# Patient Record
Sex: MALE | Race: OTHER | NOT HISPANIC OR LATINO | URBAN - METROPOLITAN AREA
[De-identification: names, ages, dates, MRNs, and addresses within clinical notes are randomized per-mention and may not be internally consistent; named-entity substitution may affect disease eponyms.]

---

## 2020-05-17 ENCOUNTER — INPATIENT (INPATIENT)
Facility: HOSPITAL | Age: 65
LOS: 2 days | Discharge: HOME | DRG: 57 | End: 2020-05-20
Attending: INTERNAL MEDICINE | Admitting: INTERNAL MEDICINE
Payer: MEDICAID

## 2020-05-17 VITALS
DIASTOLIC BLOOD PRESSURE: 82 MMHG | TEMPERATURE: 98 F | OXYGEN SATURATION: 99 % | HEIGHT: 64.57 IN | HEART RATE: 62 BPM | WEIGHT: 145.06 LBS | RESPIRATION RATE: 18 BRPM | SYSTOLIC BLOOD PRESSURE: 136 MMHG

## 2020-05-17 DIAGNOSIS — I25.10 ATHEROSCLEROTIC HEART DISEASE OF NATIVE CORONARY ARTERY WITHOUT ANGINA PECTORIS: ICD-10-CM

## 2020-05-17 DIAGNOSIS — Z29.9 ENCOUNTER FOR PROPHYLACTIC MEASURES, UNSPECIFIED: ICD-10-CM

## 2020-05-17 DIAGNOSIS — I63.9 CEREBRAL INFARCTION, UNSPECIFIED: ICD-10-CM

## 2020-05-17 DIAGNOSIS — E78.5 HYPERLIPIDEMIA, UNSPECIFIED: ICD-10-CM

## 2020-05-17 DIAGNOSIS — I10 ESSENTIAL (PRIMARY) HYPERTENSION: ICD-10-CM

## 2020-05-17 DIAGNOSIS — R07.9 CHEST PAIN, UNSPECIFIED: ICD-10-CM

## 2020-05-17 LAB
ALBUMIN SERPL ELPH-MCNC: 3.7 G/DL — SIGNIFICANT CHANGE UP (ref 3.5–5)
ALP SERPL-CCNC: 70 U/L — SIGNIFICANT CHANGE UP (ref 40–120)
ALT FLD-CCNC: 28 U/L DA — SIGNIFICANT CHANGE UP (ref 10–60)
ANION GAP SERPL CALC-SCNC: 3 MMOL/L — LOW (ref 5–17)
AST SERPL-CCNC: 23 U/L — SIGNIFICANT CHANGE UP (ref 10–40)
BASOPHILS # BLD AUTO: 0.02 K/UL — SIGNIFICANT CHANGE UP (ref 0–0.2)
BASOPHILS NFR BLD AUTO: 0.3 % — SIGNIFICANT CHANGE UP (ref 0–2)
BILIRUB SERPL-MCNC: 0.6 MG/DL — SIGNIFICANT CHANGE UP (ref 0.2–1.2)
BUN SERPL-MCNC: 13 MG/DL — SIGNIFICANT CHANGE UP (ref 7–18)
CALCIUM SERPL-MCNC: 8.5 MG/DL — SIGNIFICANT CHANGE UP (ref 8.4–10.5)
CHLORIDE SERPL-SCNC: 108 MMOL/L — SIGNIFICANT CHANGE UP (ref 96–108)
CO2 SERPL-SCNC: 32 MMOL/L — HIGH (ref 22–31)
CREAT SERPL-MCNC: 0.88 MG/DL — SIGNIFICANT CHANGE UP (ref 0.5–1.3)
EOSINOPHIL # BLD AUTO: 0.08 K/UL — SIGNIFICANT CHANGE UP (ref 0–0.5)
EOSINOPHIL NFR BLD AUTO: 1.3 % — SIGNIFICANT CHANGE UP (ref 0–6)
GLUCOSE SERPL-MCNC: 103 MG/DL — HIGH (ref 70–99)
HCT VFR BLD CALC: 44 % — SIGNIFICANT CHANGE UP (ref 39–50)
HGB BLD-MCNC: 14.3 G/DL — SIGNIFICANT CHANGE UP (ref 13–17)
IMM GRANULOCYTES NFR BLD AUTO: 0.2 % — SIGNIFICANT CHANGE UP (ref 0–1.5)
LYMPHOCYTES # BLD AUTO: 1.75 K/UL — SIGNIFICANT CHANGE UP (ref 1–3.3)
LYMPHOCYTES # BLD AUTO: 28.1 % — SIGNIFICANT CHANGE UP (ref 13–44)
MAGNESIUM SERPL-MCNC: 2.2 MG/DL — SIGNIFICANT CHANGE UP (ref 1.6–2.6)
MCHC RBC-ENTMCNC: 30.4 PG — SIGNIFICANT CHANGE UP (ref 27–34)
MCHC RBC-ENTMCNC: 32.5 GM/DL — SIGNIFICANT CHANGE UP (ref 32–36)
MCV RBC AUTO: 93.4 FL — SIGNIFICANT CHANGE UP (ref 80–100)
MONOCYTES # BLD AUTO: 0.5 K/UL — SIGNIFICANT CHANGE UP (ref 0–0.9)
MONOCYTES NFR BLD AUTO: 8 % — SIGNIFICANT CHANGE UP (ref 2–14)
NEUTROPHILS # BLD AUTO: 3.87 K/UL — SIGNIFICANT CHANGE UP (ref 1.8–7.4)
NEUTROPHILS NFR BLD AUTO: 62.1 % — SIGNIFICANT CHANGE UP (ref 43–77)
NRBC # BLD: 0 /100 WBCS — SIGNIFICANT CHANGE UP (ref 0–0)
NT-PROBNP SERPL-SCNC: 150 PG/ML — HIGH (ref 0–125)
PLATELET # BLD AUTO: 184 K/UL — SIGNIFICANT CHANGE UP (ref 150–400)
POTASSIUM SERPL-MCNC: 4.6 MMOL/L — SIGNIFICANT CHANGE UP (ref 3.5–5.3)
POTASSIUM SERPL-SCNC: 4.6 MMOL/L — SIGNIFICANT CHANGE UP (ref 3.5–5.3)
PROT SERPL-MCNC: 7.1 G/DL — SIGNIFICANT CHANGE UP (ref 6–8.3)
RBC # BLD: 4.71 M/UL — SIGNIFICANT CHANGE UP (ref 4.2–5.8)
RBC # FLD: 12.2 % — SIGNIFICANT CHANGE UP (ref 10.3–14.5)
SODIUM SERPL-SCNC: 143 MMOL/L — SIGNIFICANT CHANGE UP (ref 135–145)
TROPONIN I SERPL-MCNC: <0.015 NG/ML — SIGNIFICANT CHANGE UP (ref 0–0.04)
WBC # BLD: 6.23 K/UL — SIGNIFICANT CHANGE UP (ref 3.8–10.5)
WBC # FLD AUTO: 6.23 K/UL — SIGNIFICANT CHANGE UP (ref 3.8–10.5)

## 2020-05-17 PROCEDURE — 70450 CT HEAD/BRAIN W/O DYE: CPT | Mod: 26

## 2020-05-17 PROCEDURE — 71045 X-RAY EXAM CHEST 1 VIEW: CPT | Mod: 26

## 2020-05-17 PROCEDURE — 99285 EMERGENCY DEPT VISIT HI MDM: CPT

## 2020-05-17 RX ORDER — ATORVASTATIN CALCIUM 80 MG/1
40 TABLET, FILM COATED ORAL AT BEDTIME
Refills: 0 | Status: DISCONTINUED | OUTPATIENT
Start: 2020-05-17 | End: 2020-05-20

## 2020-05-17 RX ORDER — CLOPIDOGREL BISULFATE 75 MG/1
75 TABLET, FILM COATED ORAL DAILY
Refills: 0 | Status: DISCONTINUED | OUTPATIENT
Start: 2020-05-17 | End: 2020-05-20

## 2020-05-17 RX ORDER — ENOXAPARIN SODIUM 100 MG/ML
40 INJECTION SUBCUTANEOUS DAILY
Refills: 0 | Status: DISCONTINUED | OUTPATIENT
Start: 2020-05-17 | End: 2020-05-20

## 2020-05-17 RX ORDER — PANTOPRAZOLE SODIUM 20 MG/1
40 TABLET, DELAYED RELEASE ORAL
Refills: 0 | Status: DISCONTINUED | OUTPATIENT
Start: 2020-05-17 | End: 2020-05-20

## 2020-05-17 RX ORDER — ASPIRIN/CALCIUM CARB/MAGNESIUM 324 MG
325 TABLET ORAL ONCE
Refills: 0 | Status: COMPLETED | OUTPATIENT
Start: 2020-05-17 | End: 2020-05-17

## 2020-05-17 RX ORDER — ASPIRIN/CALCIUM CARB/MAGNESIUM 324 MG
81 TABLET ORAL DAILY
Refills: 0 | Status: DISCONTINUED | OUTPATIENT
Start: 2020-05-17 | End: 2020-05-20

## 2020-05-17 RX ADMIN — Medication 325 MILLIGRAM(S): at 19:13

## 2020-05-17 RX ADMIN — ATORVASTATIN CALCIUM 40 MILLIGRAM(S): 80 TABLET, FILM COATED ORAL at 23:35

## 2020-05-17 NOTE — H&P ADULT - ASSESSMENT
64 years old Mandarin speaking man, with PMHX of  CVA (with left sided residual numbness for over 10 years, on aspirin and statin), CAD w/stent (on aspirin and plavix), HTN (metoprolol), c/o about 10-20 days of left sided numbness worse than baseline. Patient also complains of questionable weakness in left leg which is noticed since yesterday. Patient also reports several days of "pressure" in head and chest tightness as well.   Patient denies any palpitations, shortness of breath, fever, chills, nausea, vomiting, abdominal pain, change in urinary or bowel habits.  Patient is being admitted for rule out CVA. Patient is found to have decreased sensation in left side of body, CT head is negative for any acute changes and showed chronic infarct (patient has history of stroke). Will admit to telemetry. Neurology evaluation. Likely need MRI head.  Patient is also found to have chest pressure with EKG showing T wave inversions. Patient cardiologist outpatient sent baseline EKG to compare, no new EKG changes. Will trend cardiac troponin will continue aspirin and statin.  cardiology consult requested.

## 2020-05-17 NOTE — ED PROVIDER NOTE - CLINICAL SUMMARY MEDICAL DECISION MAKING FREE TEXT BOX
63 y/o man, h/o CVA (with left sided residual numbness for over 10 years), CAD w/stent, HTN, c/o about 10-20 days of left sided numbness worse than baseline, questionable weakness in left leg, several days of "pressure" in head and chest tightness as well--CT head, EKG, CXR, labs, COVID test, reassess.

## 2020-05-17 NOTE — ED PROVIDER NOTE - CARE PLAN
Principal Discharge DX:	Chest pain, unspecified type  Secondary Diagnosis:	Numbness  Secondary Diagnosis:	Abnormal EKG

## 2020-05-17 NOTE — H&P ADULT - HISTORY OF PRESENT ILLNESS
64 years old Mandarin speaking man, with PMHX of  CVA (with left sided residual numbness for over 10 years, on aspirin and statin), CAD w/stent (on aspirin and plavix), HTN (metoprolol), c/o about 10-20 days of left sided numbness worse than baseline. Patient also complains of questionable weakness in left leg which is noticed since yesterday. Patient also reports several days of "pressure" in head and chest tightness as well.   Patient denies any palpitations, shortness of breath, fever, chills, nausea, vomiting, abdominal pain, change in urinary or bowel habits.  Patient is being admitted for rule out CVA. Patient is found to have decreased sensation in left side of body, CT head is negative for any acute changes and showed chronic infart (patient has history of stroke). Will admit to telemetry. Neurology evaluation. Likely need MRI head.  Patient is also found to have chest pressure with EKG showing T wave inversions. Patient cardiologist outpatient sent baseline EKG to compare, no new EKG changes. Will trend cardiac troponin will continue aspirin and statin.  cardiology consult requested.

## 2020-05-17 NOTE — H&P ADULT - PROBLEM SELECTOR PLAN 1
Patient is being admitted for rule out CVA.   Patient is found to have decreased sensation in left side of body,  CT head is negative for any acute changes and showed chronic infarct (patient has history of stroke).   Will admit to telemetry.   Neurology evaluation.   Likely need MRI head.  PT consult.  Neurological checks Q 4 hourly.  Stat CT head in case of change in mental status

## 2020-05-17 NOTE — H&P ADULT - PROBLEM SELECTOR PLAN 4
patient takes metoprolol at home.  will hold as blood pressure is fine right now.  restart after 24 hours.  dietary precautions

## 2020-05-17 NOTE — H&P ADULT - NSICDXPASTMEDICALHX_GEN_ALL_CORE_FT
PAST MEDICAL HISTORY:  Cerebrovascular accident (CVA), unspecified mechanism     Coronary artery disease involving native heart, angina presence unspecified, unspecified vessel or lesion type     Hypertension, unspecified type

## 2020-05-17 NOTE — H&P ADULT - NSHPPHYSICALEXAM_GEN_ALL_CORE
Vital Signs Last 24 Hrs  T(C): 36.7 (17 May 2020 19:37), Max: 36.8 (17 May 2020 15:25)  T(F): 98.1 (17 May 2020 19:37), Max: 98.3 (17 May 2020 15:25)  HR: 61 (17 May 2020 19:37) (61 - 62)  BP: 137/80 (17 May 2020 19:37) (136/82 - 137/80)  BP(mean): --  RR: 18 (17 May 2020 19:37) (18 - 18)  SpO2: 98% (17 May 2020 19:37) (98% - 99%)  · CONSTITUTIONAL: Well appearing, awake, alert, oriented to person, place, time/situation and in no apparent distress.  · ENMT: Airway patent, Nasal mucosa clear. Mouth with normal mucosa. Throat has no vesicles, no oropharyngeal exudates and uvula is midline.  · EYES: Clear bilaterally, pupils equal, round and reactive to light.  · CARDIAC: Normal rate, regular rhythm.  Heart sounds S1, S2.  No murmurs, rubs or gallops.  · RESPIRATORY: Breath sounds clear and equal bilaterally.  · GASTROINTESTINAL: Abdomen soft, non-tender, no guarding.  · MUSCULOSKELETAL: Spine appears normal, range of motion is not limited, no muscle or joint tenderness  · NEUROLOGICAL: Alert and oriented, no motor deficits; decreased sensation on L face/arm/leg  · SKIN: Skin normal color for race, warm, dry and intact. No evidence of rash.  · PSYCHIATRIC: Alert and oriented to person, place, time/situation. normal mood and affect. no apparent risk to self or others.

## 2020-05-17 NOTE — H&P ADULT - PROBLEM SELECTOR PLAN 2
Patient is also found to have chest pressure with EKG showing T wave inversions. Patient cardiologist outpatient sent baseline EKG to compare, no new EKG changes. Will trend cardiac troponin will continue aspirin and statin.  cardiology consult requested.

## 2020-05-17 NOTE — ED PROVIDER NOTE - PMH
Cerebrovascular accident (CVA), unspecified mechanism    Coronary artery disease involving native heart, angina presence unspecified, unspecified vessel or lesion type    Hypertension, unspecified type

## 2020-05-18 DIAGNOSIS — Z71.89 OTHER SPECIFIED COUNSELING: ICD-10-CM

## 2020-05-18 DIAGNOSIS — Z02.9 ENCOUNTER FOR ADMINISTRATIVE EXAMINATIONS, UNSPECIFIED: ICD-10-CM

## 2020-05-18 LAB
A1C WITH ESTIMATED AVERAGE GLUCOSE RESULT: 6.3 % — HIGH (ref 4–5.6)
ALBUMIN SERPL ELPH-MCNC: 3.6 G/DL — SIGNIFICANT CHANGE UP (ref 3.5–5)
ALP SERPL-CCNC: 65 U/L — SIGNIFICANT CHANGE UP (ref 40–120)
ALT FLD-CCNC: 27 U/L DA — SIGNIFICANT CHANGE UP (ref 10–60)
ANION GAP SERPL CALC-SCNC: 3 MMOL/L — LOW (ref 5–17)
APPEARANCE UR: CLEAR — SIGNIFICANT CHANGE UP
AST SERPL-CCNC: 24 U/L — SIGNIFICANT CHANGE UP (ref 10–40)
BILIRUB DIRECT SERPL-MCNC: 0.2 MG/DL — SIGNIFICANT CHANGE UP (ref 0–0.2)
BILIRUB INDIRECT FLD-MCNC: 0.4 MG/DL — SIGNIFICANT CHANGE UP (ref 0.2–1)
BILIRUB SERPL-MCNC: 0.6 MG/DL — SIGNIFICANT CHANGE UP (ref 0.2–1.2)
BILIRUB UR-MCNC: NEGATIVE — SIGNIFICANT CHANGE UP
BUN SERPL-MCNC: 22 MG/DL — HIGH (ref 7–18)
CALCIUM SERPL-MCNC: 8.7 MG/DL — SIGNIFICANT CHANGE UP (ref 8.4–10.5)
CHLORIDE SERPL-SCNC: 109 MMOL/L — HIGH (ref 96–108)
CHOLEST SERPL-MCNC: 89 MG/DL — SIGNIFICANT CHANGE UP (ref 10–199)
CK MB BLD-MCNC: 0.7 % — SIGNIFICANT CHANGE UP (ref 0–3.5)
CK MB CFR SERPL CALC: 1 NG/ML — SIGNIFICANT CHANGE UP (ref 0–3.6)
CK SERPL-CCNC: 147 U/L — SIGNIFICANT CHANGE UP (ref 35–232)
CO2 SERPL-SCNC: 31 MMOL/L — SIGNIFICANT CHANGE UP (ref 22–31)
COLOR SPEC: YELLOW — SIGNIFICANT CHANGE UP
CREAT SERPL-MCNC: 0.9 MG/DL — SIGNIFICANT CHANGE UP (ref 0.5–1.3)
DIFF PNL FLD: NEGATIVE — SIGNIFICANT CHANGE UP
ESTIMATED AVERAGE GLUCOSE: 134 MG/DL — HIGH (ref 68–114)
GLUCOSE SERPL-MCNC: 104 MG/DL — HIGH (ref 70–99)
GLUCOSE UR QL: NEGATIVE — SIGNIFICANT CHANGE UP
HCT VFR BLD CALC: 44.2 % — SIGNIFICANT CHANGE UP (ref 39–50)
HCV AB S/CO SERPL IA: 0.07 S/CO — SIGNIFICANT CHANGE UP (ref 0–0.99)
HCV AB SERPL-IMP: SIGNIFICANT CHANGE UP
HDLC SERPL-MCNC: 32 MG/DL — LOW
HGB BLD-MCNC: 14.6 G/DL — SIGNIFICANT CHANGE UP (ref 13–17)
KETONES UR-MCNC: NEGATIVE — SIGNIFICANT CHANGE UP
LEUKOCYTE ESTERASE UR-ACNC: NEGATIVE — SIGNIFICANT CHANGE UP
LIPID PNL WITH DIRECT LDL SERPL: 41 MG/DL — SIGNIFICANT CHANGE UP
MAGNESIUM SERPL-MCNC: 2.1 MG/DL — SIGNIFICANT CHANGE UP (ref 1.6–2.6)
MCHC RBC-ENTMCNC: 30.6 PG — SIGNIFICANT CHANGE UP (ref 27–34)
MCHC RBC-ENTMCNC: 33 GM/DL — SIGNIFICANT CHANGE UP (ref 32–36)
MCV RBC AUTO: 92.7 FL — SIGNIFICANT CHANGE UP (ref 80–100)
NITRITE UR-MCNC: NEGATIVE — SIGNIFICANT CHANGE UP
NRBC # BLD: 0 /100 WBCS — SIGNIFICANT CHANGE UP (ref 0–0)
PH UR: 6.5 — SIGNIFICANT CHANGE UP (ref 5–8)
PHOSPHATE SERPL-MCNC: 4 MG/DL — SIGNIFICANT CHANGE UP (ref 2.5–4.5)
PLATELET # BLD AUTO: 169 K/UL — SIGNIFICANT CHANGE UP (ref 150–400)
POTASSIUM SERPL-MCNC: 4.4 MMOL/L — SIGNIFICANT CHANGE UP (ref 3.5–5.3)
POTASSIUM SERPL-SCNC: 4.4 MMOL/L — SIGNIFICANT CHANGE UP (ref 3.5–5.3)
PROT SERPL-MCNC: 6.8 G/DL — SIGNIFICANT CHANGE UP (ref 6–8.3)
PROT UR-MCNC: NEGATIVE — SIGNIFICANT CHANGE UP
RBC # BLD: 4.77 M/UL — SIGNIFICANT CHANGE UP (ref 4.2–5.8)
RBC # FLD: 12.2 % — SIGNIFICANT CHANGE UP (ref 10.3–14.5)
SARS-COV-2 RNA SPEC QL NAA+PROBE: SIGNIFICANT CHANGE UP
SODIUM SERPL-SCNC: 143 MMOL/L — SIGNIFICANT CHANGE UP (ref 135–145)
SP GR SPEC: 1.01 — SIGNIFICANT CHANGE UP (ref 1.01–1.02)
TOTAL CHOLESTEROL/HDL RATIO MEASUREMENT: 2.8 RATIO — LOW (ref 3.4–9.6)
TRIGL SERPL-MCNC: 81 MG/DL — SIGNIFICANT CHANGE UP (ref 10–149)
TROPONIN I SERPL-MCNC: <0.015 NG/ML — SIGNIFICANT CHANGE UP (ref 0–0.04)
TSH SERPL-MCNC: 1.77 UU/ML — SIGNIFICANT CHANGE UP (ref 0.34–4.82)
UROBILINOGEN FLD QL: NEGATIVE — SIGNIFICANT CHANGE UP
WBC # BLD: 7.31 K/UL — SIGNIFICANT CHANGE UP (ref 3.8–10.5)
WBC # FLD AUTO: 7.31 K/UL — SIGNIFICANT CHANGE UP (ref 3.8–10.5)

## 2020-05-18 PROCEDURE — 99253 IP/OBS CNSLTJ NEW/EST LOW 45: CPT

## 2020-05-18 PROCEDURE — 70553 MRI BRAIN STEM W/O & W/DYE: CPT | Mod: 26

## 2020-05-18 RX ORDER — PANTOPRAZOLE SODIUM 20 MG/1
0 TABLET, DELAYED RELEASE ORAL
Qty: 0 | Refills: 0 | DISCHARGE

## 2020-05-18 RX ORDER — CLOPIDOGREL BISULFATE 75 MG/1
0 TABLET, FILM COATED ORAL
Qty: 0 | Refills: 0 | DISCHARGE

## 2020-05-18 RX ORDER — METOPROLOL TARTRATE 50 MG
0 TABLET ORAL
Qty: 0 | Refills: 0 | DISCHARGE

## 2020-05-18 RX ORDER — ASPIRIN/CALCIUM CARB/MAGNESIUM 324 MG
0 TABLET ORAL
Qty: 0 | Refills: 0 | DISCHARGE

## 2020-05-18 RX ORDER — ATORVASTATIN CALCIUM 80 MG/1
0 TABLET, FILM COATED ORAL
Qty: 0 | Refills: 0 | DISCHARGE

## 2020-05-18 RX ADMIN — ENOXAPARIN SODIUM 40 MILLIGRAM(S): 100 INJECTION SUBCUTANEOUS at 16:32

## 2020-05-18 RX ADMIN — ATORVASTATIN CALCIUM 40 MILLIGRAM(S): 80 TABLET, FILM COATED ORAL at 21:38

## 2020-05-18 RX ADMIN — Medication 81 MILLIGRAM(S): at 16:33

## 2020-05-18 RX ADMIN — PANTOPRAZOLE SODIUM 40 MILLIGRAM(S): 20 TABLET, DELAYED RELEASE ORAL at 08:53

## 2020-05-18 RX ADMIN — CLOPIDOGREL BISULFATE 75 MILLIGRAM(S): 75 TABLET, FILM COATED ORAL at 16:33

## 2020-05-18 NOTE — PROGRESS NOTE ADULT - PROBLEM SELECTOR PLAN 4
patient takes metoprolol succinate 12.5mg QD at home.  will hold as blood pressure is fine right now.  restart after 24 hours.  dietary precautions.

## 2020-05-18 NOTE — PROGRESS NOTE ADULT - PROBLEM SELECTOR PLAN 1
Patient is being admitted for rule out CVA- hx of CVA with Lt weakness  hx of craniotomy in [place] as per daughter, don't know when   Patient is found to have decreased sensation in left side of body  CT head is negative for any acute changes and showed chronic infarct (patient has history of stroke).   c/w telemonitoring  Neurology evaluation - Dr. Teague called    Likely need MRI head.  PT consult.  Neurological checks Q 4 hourly.  Stat CT head in case of change in mental status.  c/w Plavix and ASA

## 2020-05-18 NOTE — PROGRESS NOTE ADULT - SUBJECTIVE AND OBJECTIVE BOX
NP Note discussed with  Primary Attending    Patient is a 64y old  Male who presents with a chief complaint of numbness left sided; chest pressure (18 May 2020 08:27)    HPI - 64 years old Mandarin speaking man, with PMHX of  CVA (with left sided residual numbness for over 10 years, on aspirin and statin), CAD w/stent (on aspirin and plavix), HTN (metoprolol), c/o about 10-20 days of left sided numbness worse than baseline. Patient also complains of questionable weakness in left leg which is noticed since yesterday. Patient also reports several days of "pressure" in head and chest tightness as well.   Patient denies any palpitations, shortness of breath, fever, chills, nausea, vomiting, abdominal pain, change in urinary or bowel habits.  Patient is being admitted for rule out CVA. Patient is found to have decreased sensation in left side of body, CT head is negative for any acute changes and showed chronic infart (patient has history of stroke). Will admit to telemetry. Neurology evaluation. Likely need MRI head.  Patient is also found to have chest pressure with EKG showing T wave inversions. Patient cardiologist outpatient sent baseline EKG to compare, no new EKG changes. Will trend cardiac troponin will continue aspirin and statin.  cardiology consult requested.      Admitted to Tele to r/o CVA    seen and examined pt at bedside, daughter Fidencio Mccabe helped translation via phone. NIHSS score 1 due to low sensation. chest pain resolved. He has no  PCP but f/u with Cardiologist Jakub Xavier ( [phone])     INTERVAL HPI/OVERNIGHT EVENTS: Lt sided numbness     MEDICATIONS  (STANDING):  aspirin  chewable 81 milliGRAM(s) Oral daily  atorvastatin 40 milliGRAM(s) Oral at bedtime  clopidogrel Tablet 75 milliGRAM(s) Oral daily  enoxaparin Injectable 40 milliGRAM(s) SubCutaneous daily  pantoprazole    Tablet 40 milliGRAM(s) Oral before breakfast    MEDICATIONS  (PRN):      __________________________________________________  REVIEW OF SYSTEMS:    CONSTITUTIONAL: No fever,   EYES: no acute visual disturbances  NECK: No pain or stiffness  RESPIRATORY: No cough; No shortness of breath  CARDIOVASCULAR: No chest pain, no palpitations  GASTROINTESTINAL: No pain. No nausea or vomiting; No diarrhea   NEUROLOGICAL: No headache , no tremors Lt sided numbness   MUSCULOSKELETAL: No joint pain, no muscle pain  GENITOURINARY: no dysuria, no frequency, no hesitancy  PSYCHIATRY: no depression , no anxiety  ALL OTHER  ROS negative        Vital Signs Last 24 Hrs  T(C): 36.7 (18 May 2020 08:08), Max: 36.8 (17 May 2020 15:25)  T(F): 98 (18 May 2020 08:08), Max: 98.3 (17 May 2020 15:25)  HR: 57 (18 May 2020 08:08) (56 - 62)  BP: 131/82 (18 May 2020 08:08) (131/82 - 137/80)  BP(mean): --  RR: 17 (18 May 2020 08:08) (17 - 18)  SpO2: 98% (18 May 2020 08:08) (98% - 100%)    ________________________________________________  PHYSICAL EXAM:  GENERAL: NAD  HEENT: Normocephalic;  conjunctivae and sclerae clear; moist mucous membranes;   NECK : supple  CHEST/LUNG: Clear to auscultation bilaterally with good air entry   HEART: S1 S2  regular; no murmurs, gallops or rubs  ABDOMEN: Soft, Nontender, Nondistended; Bowel sounds present  EXTREMITIES: no cyanosis; no edema; no calf tenderness, diminished sensation to LUe/ LLE, strength 5/5   SKIN: warm and dry; no rash  NERVOUS SYSTEM:  Awake and alert; Oriented  to place, person and time ; no new deficits    _________________________________________________  LABS:                        14.6   7.31  )-----------( 169      ( 18 May 2020 05:51 )             44.2     05-18    143  |  109<H>  |  22<H>  ----------------------------<  104<H>  4.4   |  31  |  0.90    Ca    8.7      18 May 2020 05:51  Phos  4.0     05-18  Mg     2.1     05-18    TPro  6.8  /  Alb  3.6  /  TBili  0.6  /  DBili  0.2  /  AST  24  /  ALT  27  /  AlkPhos  65  05-18        CAPILLARY BLOOD GLUCOSE      POCT Blood Glucose.: 98 mg/dL (17 May 2020 15:37)        RADIOLOGY & ADDITIONAL TESTS:  < from: CT Head No Cont (05.17.20 @ 18:01) >  EXAM:  CT BRAIN                            PROCEDURE DATE:  05/17/2020          INTERPRETATION:  CLINICAL INDICATION: Headache, left-sided numbness.    TECHNIQUE: CT of the head was performed without the administration of intravenous contrast.    COMPARISON: None available.    FINDINGS:    The patient is status post right frontotemporal craniotomy with subjacent postoperative changes.     There is encephalomalacia and gliosis in the right frontoparietal region with associated ex vacuo dilatation of the posterior body and atrium of the right lateral ventricle, findings consistent with chronic infarct in the distal right MCA vascular territory distribution. There is no evidence of acute infarction, intracranial hemorrhage or mass lesion.  There is hypoattenuation of the subcortical and periventricular white matter, which is nonspecific finding, but most likely represents sequela of chronic microvascular ischemic disease. There are atherosclerotic calcifications of the cavernous and supraclinoid internal carotid arteries bilaterally, and also in the basilar artery. There is mild diffuse prominence of the cortical sulci related to underlying brain parenchymal volume loss.    There is no evidence of obstructive hydrocephalus. There are no extra-axial fluid collections.    The visualized intraorbital contents are normal. There is a polyp/mucus retention cyst in the right maxillary sinus and mild mucosal thickening in the right maxillary sinus. The mastoid air cells are clear. The visualized soft tissues and osseous structures appear unremarkable.      IMPRESSION:    Status post right frontotemporal craniotomy.     Chronic infarct in the right frontoparietal region (distal right MCA vascular territory).                GAVIN BONILLA M.D., ATTENDING RADIOLOGIST  This document has been electronically signed. May 17 2020  5:56PM        < end of copied text >    Imaging Personally Reviewed:  YES/NO    Consultant(s) Notes Reviewed:   YES/ No    Care Discussed with Consultants :     Plan of care was discussed with patient and /or primary care giver; all questions and concerns were addressed and care was aligned with patient's wishes.

## 2020-05-18 NOTE — PROGRESS NOTE ADULT - PROBLEM SELECTOR PLAN 2
denies chest pain on exam  Patient is also found to have chest pressure with EKG showing T wave inversions. Patient cardiologist outpatient sent baseline EKG to compare, no new EKG changes.  trend  troponin - T1 T2 negative   continue aspirin and statin.  cardiology consult requested.

## 2020-05-18 NOTE — CONSULT NOTE ADULT - ASSESSMENT
The symptoms, and the sensory exam findings, are consistent with a thalamic pain/dysesthetic syndrome.  The syndrome can developshortly after, or some time after, an ischemic (or other) lesion.  However a 16 to 17 year delay is unusual to say the least.  He could possibly have suffered a second infarct, not clearly demonstrable on CT, especially given The symptoms, and the sensory exam findings, are consistent with a thalamic pain/dysesthetic syndrome.  The syndrome can developshortly after, or some time after, an ischemic (or other) lesion.  However a 16 to 17 year delay is unusual to say the least.  He could possibly have suffered a second infarct, not clearly demonstrable on CT.  He has vascular disease, an elevated A1c, low HDL, HTN and therefore risk factors for developing a thalamic infarct.  The reported progressive worsening over months is unusual for vascular etiology.      RECOMMENDATIONS    Brain MRI w/o and w contrast.      ESR, CRP, RF, GAVIN, SPEP, TSH.

## 2020-05-18 NOTE — CONSULT NOTE ADULT - ASSESSMENT
1. Atelectasis  - Bronchodilators PRN  - O2 Supp PRN  - Incentive Spirometry  - PFTs as OP    2. R.O CVA  - Hx of CVA  - CT head noted.  - Neuro eval for numbness/tingling.   - DVT and GI PPX    3. Chest Pain  - Tele monitoring  - R/O ACS protocol  - Echo  - Cardio Eval

## 2020-05-18 NOTE — PROGRESS NOTE ADULT - PROBLEM SELECTOR PLAN 3
s/p stent in 1/2020   continue aspirin, plavix, statin.  will hold metoprolol for now.  f/u lipid panel.

## 2020-05-18 NOTE — CONSULT NOTE ADULT - REASON FOR ADMISSION
numbness left sided; chest pressure

## 2020-05-18 NOTE — CONSULT NOTE ADULT - SUBJECTIVE AND OBJECTIVE BOX
PULMONARY CONSULT NOTE      SAMMIE RASHEED  MRN-[id number]    Patient is a 64y old  Male who presents with a chief complaint of numbness left sided; chest pressure (18 May 2020 11:32)      HISTORY OF PRESENT ILLNESS:  History of Present Illness:  Reason for Admission: numbness left sided; chest pressure	  History of Present Illness: 	  64 years old Mandarin speaking man, with PMHX of  CVA (with left sided residual numbness for over 10 years, on aspirin and statin), CAD w/stent (on aspirin and plavix), HTN (metoprolol), c/o about 10-20 days of left sided numbness worse than baseline. Patient also complains of questionable weakness in left leg which is noticed since yesterday. Patient also reports several days of "pressure" in head and chest tightness as well.   Patient denies any palpitations, shortness of breath, fever, chills, nausea, vomiting, abdominal pain, change in urinary or bowel habits.  Patient is being admitted for rule out CVA. Patient is found to have decreased sensation in left side of body, CT head is negative for any acute changes and showed chronic infart (patient has history of stroke). Will admit to telemetry. Neurology evaluation. Likely need MRI head.  Patient is also found to have chest pressure with EKG showing T wave inversions. Patient cardiologist outpatient sent baseline EKG to compare, no new EKG changes. Will trend cardiac troponin will continue aspirin and statin.  cardiology consult requested.    Pt is awake, alert, lying in bed in NAD.     MEDICATIONS  (STANDING):  aspirin  chewable 81 milliGRAM(s) Oral daily  atorvastatin 40 milliGRAM(s) Oral at bedtime  clopidogrel Tablet 75 milliGRAM(s) Oral daily  enoxaparin Injectable 40 milliGRAM(s) SubCutaneous daily  pantoprazole    Tablet 40 milliGRAM(s) Oral before breakfast      MEDICATIONS  (PRN):      Allergies    No Known Allergies    Intolerances        PAST MEDICAL & SURGICAL HISTORY:  Coronary artery disease involving native heart, angina presence unspecified, unspecified vessel or lesion type  Hypertension, unspecified type  Cerebrovascular accident (CVA), unspecified mechanism      FAMILY HISTORY:      SOCIAL HISTORY  Smoking History:     REVIEW OF SYSTEMS:    CONSTITUTIONAL:  No fevers, chills, sweats    HEENT:  Eyes:  No diplopia or blurred vision. ENT:  No earache, sore throat or runny nose.    CARDIOVASCULAR:  No pressure, squeezing, tightness, or heaviness about the chest; no palpitations.    RESPIRATORY:  Per HPI    GASTROINTESTINAL:  No abdominal pain, nausea, vomiting or diarrhea.    GENITOURINARY:  No dysuria, frequency or urgency.    NEUROLOGIC:  No paresthesias, fasciculations, seizures or weakness.    PSYCHIATRIC:  No disorder of thought or mood.    Vital Signs Last 24 Hrs  T(C): 36.8 (18 May 2020 11:26), Max: 36.8 (17 May 2020 15:25)  T(F): 98.2 (18 May 2020 11:26), Max: 98.3 (17 May 2020 15:25)  HR: 62 (18 May 2020 11:) (56 - 62)  BP: 132/66 (18 May 2020 11:) (131/82 - 137/80)  BP(mean): --  RR: 18 (18 May 2020 11:26) (17 - 18)  SpO2: 95% (18 May 2020 11:) (95% - 100%)  I&O's Detail      PHYSICAL EXAMINATION:    GENERAL: The patient is a well-developed, well-nourished no apparent distress.     HEENT: Head is normocephalic and atraumatic. Extraocular muscles are intact. Mucous membranes are moist.     NECK: Supple.     LUNGS: Clear to auscultation without wheezing, rales, or rhonchi. Respirations unlabored    HEART: Regular rate and rhythm without murmur.    ABDOMEN: Soft, nontender, and nondistended.  No hepatosplenomegaly is noted.    EXTREMITIES: Without any cyanosis, clubbing, rash, lesions or edema.    NEUROLOGIC: Grossly intact.      LABS:                        14.6   7.31  )-----------( 169      ( 18 May 2020 05:51 )             44.2     -18    143  |  109<H>  |  22<H>  ----------------------------<  104<H>  4.4   |  31  |  0.90    Ca    8.7      18 May 2020 05:51  Phos  4.0       Mg     2.1         TPro  6.8  /  Alb  3.6  /  TBili  0.6  /  DBili  0.2  /  AST  24  /  ALT  27  /  AlkPhos  65  05-18      Urinalysis Basic - ( 18 May 2020 10:01 )    Color: Yellow / Appearance: Clear / S.015 / pH: x  Gluc: x / Ketone: Negative  / Bili: Negative / Urobili: Negative   Blood: x / Protein: Negative / Nitrite: Negative   Leuk Esterase: Negative / RBC: x / WBC x   Sq Epi: x / Non Sq Epi: x / Bacteria: x        CARDIAC MARKERS ( 18 May 2020 01:39 )  <0.015 ng/mL / x     / 147 U/L / x     / 1.0 ng/mL  CARDIAC MARKERS ( 17 May 2020 16:54 )  <0.015 ng/mL / x     / x     / x     / x            Serum Pro-Brain Natriuretic Peptide: 150 pg/mL (20 @ 16:54)          MICROBIOLOGY:    RADIOLOGY & ADDITIONAL STUDIES:    CXR:  < from: Xray Chest 1 View AP/PA (20 @ 18:43) >  IMPRESSION:  Atelectasis lung bases. No consolidation or pleural effusion    Heart size within normal limits.    < end of copied text >    Ct scan chest:  < from: CT Head No Cont (20 @ 18:01) >    IMPRESSION:    Status post right frontotemporal craniotomy.     Chronic infarct in the right frontoparietal region (distal right MCA vascular territory).          < end of copied text >    ekg;    echo: PULMONARY CONSULT NOTE      SAMMIE RASHEED  MRN-[id number]    Patient is a 64y old  Male who presents with a chief complaint of numbness left sided; chest pressure (18 May 2020 11:32)      HISTORY OF PRESENT ILLNESS:  History of Present Illness:  Reason for Admission: numbness left sided; chest pressure	  History of Present Illness: 	  64 years old Mandarin speaking man, with PMHX of  CVA (with left sided residual numbness for over 10 years, on aspirin and statin), CAD w/stent (on aspirin and plavix), HTN (metoprolol), c/o about 10-20 days of left sided numbness worse than baseline. Patient also complains of questionable weakness in left leg which is noticed since yesterday. Patient also reports several days of "pressure" in head and chest tightness as well.   Patient denies any palpitations, shortness of breath, fever, chills, nausea, vomiting, abdominal pain, change in urinary or bowel habits.  Patient is being admitted for rule out CVA. Patient is found to have decreased sensation in left side of body, CT head is negative for any acute changes and showed chronic infart (patient has history of stroke). Will admit to telemetry. Neurology evaluation. Likely need MRI head.  Patient is also found to have chest pressure with EKG showing T wave inversions. Patient cardiologist outpatient sent baseline EKG to compare, no new EKG changes. Will trend cardiac troponin will continue aspirin and statin.  cardiology consult requested.    Pt is awake, alert, lying in bed in NAD.     MEDICATIONS  (STANDING):  aspirin  chewable 81 milliGRAM(s) Oral daily  atorvastatin 40 milliGRAM(s) Oral at bedtime  clopidogrel Tablet 75 milliGRAM(s) Oral daily  enoxaparin Injectable 40 milliGRAM(s) SubCutaneous daily  pantoprazole    Tablet 40 milliGRAM(s) Oral before breakfast      MEDICATIONS  (PRN):      Allergies    No Known Allergies    Intolerances        PAST MEDICAL & SURGICAL HISTORY:  Coronary artery disease involving native heart, angina presence unspecified, unspecified vessel or lesion type  Hypertension, unspecified type  Cerebrovascular accident (CVA), unspecified mechanism      FAMILY HISTORY:      SOCIAL HISTORY  Smoking History:     REVIEW OF SYSTEMS:    CONSTITUTIONAL:  No fevers, chills, sweats    HEENT:  Eyes:  No diplopia or blurred vision. ENT:  No earache, sore throat or runny nose.    CARDIOVASCULAR:  No pressure, squeezing, tightness, or heaviness about the chest; no palpitations.    RESPIRATORY:  Per HPI    GASTROINTESTINAL:  No abdominal pain, nausea, vomiting or diarrhea.    GENITOURINARY:  No dysuria, frequency or urgency.    NEUROLOGIC:  No paresthesias, fasciculations, seizures or weakness.    PSYCHIATRIC:  No disorder of thought or mood.    Vital Signs Last 24 Hrs  T(C): 36.8 (18 May 2020 11:26), Max: 36.8 (17 May 2020 15:25)  T(F): 98.2 (18 May 2020 11:26), Max: 98.3 (17 May 2020 15:25)  HR: 62 (18 May 2020 11:) (56 - 62)  BP: 132/66 (18 May 2020 11:) (131/82 - 137/80)  BP(mean): --  RR: 18 (18 May 2020 11:26) (17 - 18)  SpO2: 95% (18 May 2020 11:) (95% - 100%)  I&O's Detail      PHYSICAL EXAMINATION:    GENERAL: The patient is a well-developed, well-nourished no apparent distress.     HEENT: Head is normocephalic and atraumatic. Extraocular muscles are intact. Mucous membranes are moist.     NECK: Supple.     LUNGS: Clear to auscultation without wheezing, rales, or rhonchi. Respirations unlabored    HEART: Regular rate and rhythm without murmur.    ABDOMEN: Soft, nontender, and nondistended.  No hepatosplenomegaly is noted.    EXTREMITIES: Without any cyanosis, clubbing, rash, lesions or edema.    NEUROLOGIC: Grossly intact.      LABS:                        14.6   7.31  )-----------( 169      ( 18 May 2020 05:51 )             44.2     -18    143  |  109<H>  |  22<H>  ----------------------------<  104<H>  4.4   |  31  |  0.90    Ca    8.7      18 May 2020 05:51  Phos  4.0       Mg     2.1         TPro  6.8  /  Alb  3.6  /  TBili  0.6  /  DBili  0.2  /  AST  24  /  ALT  27  /  AlkPhos  65  05-18      Urinalysis Basic - ( 18 May 2020 10:01 )    Color: Yellow / Appearance: Clear / S.015 / pH: x  Gluc: x / Ketone: Negative  / Bili: Negative / Urobili: Negative   Blood: x / Protein: Negative / Nitrite: Negative   Leuk Esterase: Negative / RBC: x / WBC x   Sq Epi: x / Non Sq Epi: x / Bacteria: x        CARDIAC MARKERS ( 18 May 2020 01:39 )  <0.015 ng/mL / x     / 147 U/L / x     / 1.0 ng/mL  CARDIAC MARKERS ( 17 May 2020 16:54 )  <0.015 ng/mL / x     / x     / x     / x            Serum Pro-Brain Natriuretic Peptide: 150 pg/mL (20 @ 16:54)        COVID-19 PCR . (20 @ 16:54)    COVID-19 PCR: NotDetec: This test has been validated by [employer] to be accurate;  though it has not been FDA cleared/approved by the usual pathway.  As with all laboratory tests, results should be correlated with clinical  findings.  https://www.fda.gov/media/467114/download  https://www.fda.gov/media/089047/download      MICROBIOLOGY:    RADIOLOGY & ADDITIONAL STUDIES:    CXR:  < from: Xray Chest 1 View AP/PA (20 @ 18:43) >  IMPRESSION:  Atelectasis lung bases. No consolidation or pleural effusion    Heart size within normal limits.    < end of copied text >    Ct scan chest:  < from: CT Head No Cont (20 @ 18:01) >    IMPRESSION:    Status post right frontotemporal craniotomy.     Chronic infarct in the right frontoparietal region (distal right MCA vascular territory).          < end of copied text >    ekg;    echo:

## 2020-05-18 NOTE — CONSULT NOTE ADULT - SUBJECTIVE AND OBJECTIVE BOX
CHIEF COMPLAINT:Patient is a 64y old  Male who presents with a chief complaint of numbness left sided; chest pressure (18 May 2020 09:25)      HPI:  64 years old Mandarin speaking man, with PMHX of  CVA (with left sided residual numbness for over 10 years, on aspirin and statin), CAD w/stent (on aspirin and plavix), HTN (metoprolol), c/o about 10-20 days of left sided numbness worse than baseline. Patient also complains of questionable weakness in left leg which is noticed since yesterday. Patient also reports several days of "pressure" in head and chest tightness as well.   Patient denies any palpitations, shortness of breath, fever, chills, nausea, vomiting, abdominal pain, change in urinary or bowel habits.  Patient is being admitted for rule out CVA. Patient is found to have decreased sensation in left side of body, CT head is negative for any acute changes and showed chronic infart (patient has history of stroke). Will admit to telemetry. Neurology evaluation. Likely need MRI head.  Patient is also found to have chest pressure with EKG showing T wave inversions. Patient cardiologist outpatient sent baseline EKG to compare, no new EKG changes. Will trend cardiac troponin will continue aspirin and statin.  cardiology consult requested. (17 May 2020 22:48)      PAST MEDICAL & SURGICAL HISTORY:  Coronary artery disease involving native heart, angina presence unspecified, unspecified vessel or lesion type  Hypertension, unspecified type  Cerebrovascular accident (CVA), unspecified mechanism  S/P Craniotomy    MEDICATIONS  (STANDING):  aspirin  chewable 81 milliGRAM(s) Oral daily  atorvastatin 40 milliGRAM(s) Oral at bedtime  clopidogrel Tablet 75 milliGRAM(s) Oral daily  enoxaparin Injectable 40 milliGRAM(s) SubCutaneous daily  pantoprazole    Tablet 40 milliGRAM(s) Oral before breakfast    MEDICATIONS  (PRN):      FAMILY HISTORY:No hx of CAD      SOCIAL HISTORY:    [ x] Non-smoker    [x ] Alcohol-denies    Allergies    No Known Allergies    Intolerances    	    REVIEW OF SYSTEMS:  CONSTITUTIONAL: No fever, weight loss, or fatigue  EYES: No eye pain, visual disturbances, or discharge  ENT:  No difficulty hearing, tinnitus, vertigo; No sinus or throat pain  NECK: No pain or stiffness  RESPIRATORY: No cough, wheezing, chills or hemoptysis; No Shortness of Breath  CARDIOVASCULAR: + chest pain,No palpitations, passing out, dizziness, or leg swelling  GASTROINTESTINAL: No abdominal or epigastric pain. No nausea, vomiting, or hematemesis; No diarrhea or constipation. No melena or hematochezia.  GENITOURINARY: No dysuria, frequency, hematuria, or incontinence  NEUROLOGICAL: No headaches, memory loss, loss of strength, +numbness  SKIN: No itching, burning, rashes, or lesions   LYMPH Nodes: No enlarged glands  ENDOCRINE: No heat or cold intolerance; No hair loss  MUSCULOSKELETAL: No joint pain or swelling; No muscle, back, or extremity pain  PSYCHIATRIC: No depression, anxiety, mood swings, or difficulty sleeping  HEME/LYMPH: No easy bruising, or bleeding gums  ALLERGY AND IMMUNOLOGIC: No hives or eczema	      PHYSICAL EXAM:  T(C): 36.8 (05-18-20 @ 11:26), Max: 36.8 (05-17-20 @ 15:25)  HR: 62 (05-18-20 @ 11:26) (56 - 62)  BP: 132/66 (05-18-20 @ 11:26) (131/82 - 137/80)  RR: 18 (05-18-20 @ 11:26) (17 - 18)  SpO2: 95% (05-18-20 @ 11:26) (95% - 100%)        Appearance: Normal	  HEENT:   Normal oral mucosa, PERRL, EOMI	  Lymphatic: No lymphadenopathy  Cardiovascular: Normal S1 S2, No JVD, No murmurs, No edema  Respiratory: Lungs clear to auscultation	  Psychiatry: A & O x 3, Mood & affect appropriate  Gastrointestinal:  Soft, Non-tender, + BS	  Skin: No rashes, No ecchymoses, No cyanosis	  Neurologic: Non-focal  Extremities: Normal range of motion, No clubbing, cyanosis or edema  Vascular: Peripheral pulses palpable 2+ bilaterally     	    ECG:  	NSR,LAE,T wave inversion infero-lateral leads  	  	  LABS:	 	      CARDIAC MARKERS ( 18 May 2020 01:39 )  <0.015 ng/mL / x     / 147 U/L / x     / 1.0 ng/mL  CARDIAC MARKERS ( 17 May 2020 16:54 )  <0.015 ng/mL / x     / x     / x     / x                                  14.6   7.31  )-----------( 169      ( 18 May 2020 05:51 )             44.2     05-18    143  |  109<H>  |  22<H>  ----------------------------<  104<H>  4.4   |  31  |  0.90    Ca    8.7      18 May 2020 05:51  Phos  4.0     05-18  Mg     2.1     05-18    TPro  6.8  /  Alb  3.6  /  TBili  0.6  /  DBili  0.2  /  AST  24  /  ALT  27  /  AlkPhos  65  05-18    proBNP: Serum Pro-Brain Natriuretic Peptide: 150 pg/mL (05-17 @ 16:54)    Lipid Profile: Cholesterol 89  LDL 41  HDL 32  TG 81      TSH: Thyroid Stimulating Hormone, Serum: 1.77 uU/mL (05-18 @ 05:51)      EXAM:  CT BRAIN                            PROCEDURE DATE:  05/17/2020          INTERPRETATION:  CLINICAL INDICATION: Headache, left-sided numbness.    TECHNIQUE: CT of the head was performed without the administration of intravenous contrast.    COMPARISON: None available.    FINDINGS:    The patient is status post right frontotemporal craniotomy with subjacent postoperative changes.     There is encephalomalacia and gliosis in the right frontoparietal region with associated ex vacuo dilatation of the posterior body and atrium of the right lateral ventricle, findings consistent with chronic infarct in the distal right MCA vascular territory distribution. There is no evidence of acute infarction, intracranial hemorrhage or mass lesion.  There is hypoattenuation of the subcortical and periventricular white matter, which is nonspecific finding, but most likely represents sequela of chronic microvascular ischemic disease. There are atherosclerotic calcifications of the cavernous and supraclinoid internal carotid arteries bilaterally, and also in the basilar artery. There is mild diffuse prominence of the cortical sulci related to underlying brain parenchymal volume loss.    There is no evidence of obstructive hydrocephalus. There are no extra-axial fluid collections.    The visualized intraorbital contents are normal. There is a polyp/mucus retention cyst in the right maxillary sinus and mild mucosal thickening in the right maxillary sinus. The mastoid air cells are clear. The visualized soft tissues and osseous structures appear unremarkable.      IMPRESSION:    Status post right frontotemporal craniotomy.     Chronic infarct in the right frontoparietal region (distal right MCA vascular territory).      COVID-.

## 2020-05-18 NOTE — PROGRESS NOTE ADULT - SUBJECTIVE AND OBJECTIVE BOX
64 years old Mandarin speaking man, with PMHX of  CVA (with left sided residual numbness for over 10 years, on aspirin and statin), CAD w/stent (on aspirin and plavix), HTN (metoprolol), c/o about 10-20 days of left sided numbness worse than baseline. Patient also complains of questionable weakness in left leg which is noticed since yesterday. Patient also reports several days of "pressure" in head and chest tightness as well.   Patient denies any palpitations, shortness of breath, fever, chills, nausea, vomiting, abdominal pain, change in urinary or bowel habits.  Patient is being admitted for rule out CVA. Patient is found to have decreased sensation in left side of body, CT head is negative for any acute changes and showed chronic infart (patient has history of stroke). Will admit to telemetry. Neurology evaluation. Likely need MRI head.  Patient is also found to have chest pressure with EKG showing T wave inversions. Patient cardiologist outpatient sent baseline EKG to compare, no new EKG changes. Will trend cardiac troponin will continue aspirin and statin.  cardiology consult requested.    Review of Systems:  Other Review of Systems: All other review of systems negative, except as noted in HPI 	      pt seen in ed tele [ x ], reg med floor [   ], bed [ x ], chair at bedside [   ], a+o x3 [ x ], lethargic [  ],  nad [ x ]          Allergies    No Known Allergies        Vitals    T(F): 98 (05-18-20 @ 08:08), Max: 98.3 (05-17-20 @ 15:25)  HR: 57 (05-18-20 @ 08:08) (56 - 62)  BP: 131/82 (05-18-20 @ 08:08) (131/82 - 137/80)  RR: 17 (05-18-20 @ 08:08) (17 - 18)  SpO2: 98% (05-18-20 @ 08:08) (98% - 100%)  Wt(kg): --  CAPILLARY BLOOD GLUCOSE      POCT Blood Glucose.: 98 mg/dL (17 May 2020 15:37)      Labs                          14.6   7.31  )-----------( 169      ( 18 May 2020 05:51 )             44.2       05-18    143  |  109<H>  |  22<H>  ----------------------------<  104<H>  4.4   |  31  |  0.90    Ca    8.7      18 May 2020 05:51  Phos  4.0     05-18  Mg     2.1     05-18    TPro  6.8  /  Alb  3.6  /  TBili  0.6  /  DBili  0.2  /  AST  24  /  ALT  27  /  AlkPhos  65  05-18      CARDIAC MARKERS ( 18 May 2020 01:39 )  <0.015 ng/mL / x     / 147 U/L / x     / 1.0 ng/mL  CARDIAC MARKERS ( 17 May 2020 16:54 )  <0.015 ng/mL / x     / x     / x     / x                Radiology Results      Meds    MEDICATIONS  (STANDING):  aspirin  chewable 81 milliGRAM(s) Oral daily  atorvastatin 40 milliGRAM(s) Oral at bedtime  clopidogrel Tablet 75 milliGRAM(s) Oral daily  enoxaparin Injectable 40 milliGRAM(s) SubCutaneous daily  pantoprazole    Tablet 40 milliGRAM(s) Oral before breakfast      MEDICATIONS  (PRN):      Physical Exam    Neuro :  no focal motor deficits  Respiratory: CTA B/L  CV: RRR, S1S2, no murmurs,   Abdominal: Soft, NT, ND +BS,  Extremities: No edema, + peripheral pulses    ASSESSMENT    r/o cva  Chest pain with ekg changes   r/o acs  h/o Coronary artery disease involving native heart, angina presence unspecified, unspecified vessel or lesion type  Hypertension, unspecified type  Cerebrovascular accident (CVA), unspecified mechanism s/p craniotomy with left residual numbness      PLAN    neuro cons  cardio cons  f/u echo 64 years old Mandarin speaking man, with PMHX of  CVA (with left sided residual numbness for over 10 years, on aspirin and statin), CAD w/stent (on aspirin and plavix), HTN (metoprolol), c/o about 10-20 days of left sided numbness worse than baseline. Patient also complains of questionable weakness in left leg which is noticed since yesterday. Patient also reports several days of "pressure" in head and chest tightness as well.   Patient denies any palpitations, shortness of breath, fever, chills, nausea, vomiting, abdominal pain, change in urinary or bowel habits.  Patient is being admitted for rule out CVA. Patient is found to have decreased sensation in left side of body, CT head is negative for any acute changes and showed chronic infart (patient has history of stroke). Will admit to telemetry. Neurology evaluation. Likely need MRI head.  Patient is also found to have chest pressure with EKG showing T wave inversions. Patient cardiologist outpatient sent baseline EKG to compare, no new EKG changes. Will trend cardiac troponin will continue aspirin and statin.  cardiology consult requested.    Review of Systems:  Other Review of Systems: All other review of systems negative, except as noted in HPI 	      pt seen in ed tele [ x ], reg med floor [   ], bed [ x ], chair at bedside [   ], a+o x3 [ x ], lethargic [  ],  nad [ x ]          Allergies    No Known Allergies        Vitals    T(F): 98 (05-18-20 @ 08:08), Max: 98.3 (05-17-20 @ 15:25)  HR: 57 (05-18-20 @ 08:08) (56 - 62)  BP: 131/82 (05-18-20 @ 08:08) (131/82 - 137/80)  RR: 17 (05-18-20 @ 08:08) (17 - 18)  SpO2: 98% (05-18-20 @ 08:08) (98% - 100%)  Wt(kg): --  CAPILLARY BLOOD GLUCOSE      POCT Blood Glucose.: 98 mg/dL (17 May 2020 15:37)      Labs                          14.6   7.31  )-----------( 169      ( 18 May 2020 05:51 )             44.2       05-18    143  |  109<H>  |  22<H>  ----------------------------<  104<H>  4.4   |  31  |  0.90    Ca    8.7      18 May 2020 05:51  Phos  4.0     05-18  Mg     2.1     05-18    TPro  6.8  /  Alb  3.6  /  TBili  0.6  /  DBili  0.2  /  AST  24  /  ALT  27  /  AlkPhos  65  05-18      CARDIAC MARKERS ( 18 May 2020 01:39 )  <0.015 ng/mL / x     / 147 U/L / x     / 1.0 ng/mL  CARDIAC MARKERS ( 17 May 2020 16:54 )  <0.015 ng/mL / x     / x     / x     / x                Radiology Results    < from: CT Head No Cont (05.17.20 @ 18:01) >  The patient is status post right frontotemporal craniotomy with subjacent postoperative changes.     There is encephalomalacia and gliosis in the right frontoparietal region with associated ex vacuo dilatation of the posterior body and atrium of the right lateral ventricle, findings consistent with chronic infarct in the distal right MCA vascular territory distribution. There is no evidence of acute infarction, intracranial hemorrhage or mass lesion.  There is hypoattenuation of the subcortical and periventricular white matter, which is nonspecific finding, but most likely represents sequela of chronic microvascular ischemic disease. There are atherosclerotic calcifications of the cavernous and supraclinoid internal carotid arteries bilaterally, and also in the basilar artery. There is mild diffuse prominence of the cortical sulci related to underlying brain parenchymal volume loss.    There is no evidence of obstructive hydrocephalus. There are no extra-axial fluid collections.    The visualized intraorbital contents are normal. There is a polyp/mucus retention cyst in the right maxillary sinus and mild mucosal thickening in the right maxillary sinus. The mastoid air cells are clear. The visualized soft tissues and osseous structures appear unremarkable.      IMPRESSION:    Status post right frontotemporal craniotomy.     Chronic infarct in the right frontoparietal region (distal right MCA vascular territory).    < end of copied text >        Meds    MEDICATIONS  (STANDING):  aspirin  chewable 81 milliGRAM(s) Oral daily  atorvastatin 40 milliGRAM(s) Oral at bedtime  clopidogrel Tablet 75 milliGRAM(s) Oral daily  enoxaparin Injectable 40 milliGRAM(s) SubCutaneous daily  pantoprazole    Tablet 40 milliGRAM(s) Oral before breakfast      MEDICATIONS  (PRN):      Physical Exam    Neuro :  no focal motor deficits  Respiratory: CTA B/L  CV: RRR, S1S2, no murmurs,   Abdominal: Soft, NT, ND +BS,  Extremities: No edema, + peripheral pulses    ASSESSMENT    r/o cva  Chest pain with ekg changes   r/o acs  h/o Coronary artery disease involving native heart, angina presence unspecified, unspecified vessel or lesion type  Hypertension, unspecified type  Cerebrovascular accident (CVA), unspecified mechanism s/p craniotomy with left residual numbness      PLAN      ct head with Status post right frontotemporal craniotomy. Chronic infarct in the right frontoparietal region (distal right MCA vascular territory) noted above.  neuro cons   cont asa, plavix and statin  ce x2 neg noted above  cardio cons   Patient cardiologist outpatient sent baseline EKG to compare, no new EKG changes.  f/u echo  cont current meds

## 2020-05-18 NOTE — CONSULT NOTE ADULT - ASSESSMENT
64 years old Mandarin speaking man, with PMHX of  CVA (with left sided residual numbness for over 10 years, on aspirin and statin), CAD w/stent (on aspirin and plavix), HTN (metoprolol), c/o about 10-20 days of left sided numbness worse than baseline.  1.Tele monitoring.  2.Neurology eval.  3.Echocardiogram.  4.CAD-asa.plavix,statin, b blocker on hold.  5.CVA-asa,plavix,statin.  6.HTN-b blocker on hold until cleared by neurology.  7.GI and DVT prophylaxis.

## 2020-05-18 NOTE — CONSULT NOTE ADULT - SUBJECTIVE AND OBJECTIVE BOX
Pt does not speak English. Per admission H&P:  <Start of quote from H&P>  " History of Present Illness:  Reason for Admission: numbness left sided; chest pressure	  History of Present Illness: 	  64 years old Mandarin speaking man, with PMHX of  CVA (with left sided residual numbness for over 10 years, on aspirin and statin), CAD w/stent (on aspirin and plavix), HTN (metoprolol), c/o about 10-20 days of left sided numbness worse than baseline. Patient also complains of questionable weakness in left leg which is noticed since yesterday. Patient also reports several days of "pressure" in head and chest tightness as well.   Patient denies any palpitations, shortness of breath, fever, chills, nausea, vomiting, abdominal pain, change in urinary or bowel habits.  Patient is being admitted for rule out CVA. Patient is found to have decreased sensation in left side of body, CT head is negative for any acute changes and showed chronic infart (patient has history of stroke). Will admit to telemetry. Neurology evaluation. Likely need MRI head.  Patient is also found to have chest pressure with EKG showing T wave inversions. Patient cardiologist outpatient sent baseline EKG to compare, no new EKG changes. Will trend cardiac troponin will continue aspirin and statin.  cardiology consult requested.     Review of Systems:  Other Review of Systems: All other review of systems negative, except as noted in HPI 	      Allergies and Intolerances:        Allergies:  	No Known Allergies:     Home Medications:   * Patient Currently Takes Medications as of 17-May-2020 17:34 documented in Structured Notes  · 	Maile Aspirin: Last Dose Taken:    · 	Lipitor: Last Dose Taken:    · 	Plavix 75 mg oral tablet: Last Dose Taken:    · 	metoprolol: Last Dose Taken:    · 	Protonix: Last Dose Taken:      Patient History:    Past Medical, Past Surgical, and Family History:  PAST MEDICAL HISTORY:  Cerebrovascular accident (CVA), unspecified mechanism     Coronary artery disease involving native heart, angina presence unspecified, unspecified vessel or lesion type     Hypertension, unspecified type.     Social History:  Social History (marital status, living situation, occupation, tobacco use, alcohol and drug use, and sexual history): denies active smoking, alcohol or illicit drug use	     Tobacco Screening:  · Core Measure Site	Yes	  · Has the patient used tobacco in the past 30 days?	No"	  <End of quote from H&P>      Additional Hx as obtained from Pt with (at his specific request) his daughter acting as :     Pt suffered a stroke in [place] in 2003 resulting in L side weakness and numbness.  He did not have an ICH.  A neurosurgical procedure was performed to restore blood flow through a narrowed blood vessel.  The surgeon had gone to study in [place] to learn the technique, and Pt was one of the first to undergo the procedure in [place].  Since then he has felt hypesthetic on the L side.  Some months ago he developed in addition a sensation of discomfort on the L side; the discomfort has slowly gotten worse.  It is not always present, and not always involving the entire L side, but it can involve scalp, face, torso, limbs.      From radiologist's report of non-con head CT;  <Start of quote from head CT report>  "FINDINGS    The patient is status post right frontotemporal craniotomy with subjacent postoperative changes.     There is encephalomalacia and gliosis in the right frontoparietal region with associated ex vacuo dilatation of the posterior body and atrium of the right lateral ventricle, findings consistent with chronic infarct in the distal right MCA vascular territory distribution. There is no evidence of acute infarction, intracranial hemorrhage or mass lesion.  There is hypoattenuation of the subcortical and periventricular white matter, which is nonspecific finding, but most likely represents sequela of chronic microvascular ischemic disease. There are atherosclerotic calcifications of the cavernous and supraclinoid internal carotid arteries bilaterally, and also in the basilar artery. There is mild diffuse prominence of the cortical sulci related to underlying brain parenchymal volume loss.    There is no evidence of obstructive hydrocephalus. There are no extra-axial fluid collections.    The visualized intraorbital contents are normal. There is a polyp/mucus retention cyst in the right maxillary sinus and mild mucosal thickening in the right maxillary sinus. The mastoid air cells are clear. The visualized soft tissues and osseous structures appear unremarkable.      IMPRESSION:    Status post right frontotemporal craniotomy.     Chronic infarct in the right frontoparietal region (distal right MCA vascular territory)."  <End of quote from head CT report>    I note from lab test results:    Hgb A1c 6.3% (no Hx of diabetes; Pt and daughter both reporting)  HDL cholesterol 34  COVID 19 PCR neg.       EXAMINATION    Alert; by observation normal expression, comprehension, prosody, rate of speech in Mandarin.      Follows visual cues well for examination.      PERRL; EOMI.  Normal facial, lingual movements.  No UE drift.  HALLE: slightly slower multi-finger tapping L hand.  Symmetric normal thumb/forefinger tapping, toe tapping, heel stomping.      Confrontation testing of major muscle groups: normal and symmetric.      Normal brisk gait.  Walks well on toes and on heels.  Normal station and balance.     Reliably differentiates P and LT on scalp, face, limbs.  Pin stimuli on L side are perceived as more intense than on the R side, and irritating; normal on R side.          Reflex                           Right    Left   Comment    Scapulohumeral           absent   absent  Biceps                             2          2  Triceps                            2         2  Brachiorad                       0       0  Taylor                      absent  absent  Patellar                            2       2  crossed add response L-->R  Gastroc                         0/1+   1+/1+   wo/w reinforcement  Plantar                        flexor   flexor

## 2020-05-19 LAB
% ALBUMIN: 60.4 % — SIGNIFICANT CHANGE UP
% ALPHA 1: 3.2 % — SIGNIFICANT CHANGE UP
% ALPHA 2: 10 % — SIGNIFICANT CHANGE UP
% BETA: 10.1 % — SIGNIFICANT CHANGE UP
% GAMMA: 16.3 % — SIGNIFICANT CHANGE UP
ALBUMIN SERPL ELPH-MCNC: 4.1 G/DL — SIGNIFICANT CHANGE UP (ref 3.6–5.5)
ALBUMIN/GLOB SERPL ELPH: 1.5 RATIO — SIGNIFICANT CHANGE UP
ALPHA1 GLOB SERPL ELPH-MCNC: 0.2 G/DL — SIGNIFICANT CHANGE UP (ref 0.1–0.4)
ALPHA2 GLOB SERPL ELPH-MCNC: 0.7 G/DL — SIGNIFICANT CHANGE UP (ref 0.5–1)
ANION GAP SERPL CALC-SCNC: 3 MMOL/L — LOW (ref 5–17)
B-GLOBULIN SERPL ELPH-MCNC: 0.7 G/DL — SIGNIFICANT CHANGE UP (ref 0.5–1)
BUN SERPL-MCNC: 19 MG/DL — HIGH (ref 7–18)
CALCIUM SERPL-MCNC: 8.6 MG/DL — SIGNIFICANT CHANGE UP (ref 8.4–10.5)
CHLORIDE SERPL-SCNC: 108 MMOL/L — SIGNIFICANT CHANGE UP (ref 96–108)
CO2 SERPL-SCNC: 31 MMOL/L — SIGNIFICANT CHANGE UP (ref 22–31)
CREAT SERPL-MCNC: 0.85 MG/DL — SIGNIFICANT CHANGE UP (ref 0.5–1.3)
CRP SERPL-MCNC: <0.1 MG/DL — SIGNIFICANT CHANGE UP (ref 0–0.4)
ERYTHROCYTE [SEDIMENTATION RATE] IN BLOOD: 3 MM/HR — SIGNIFICANT CHANGE UP (ref 0–20)
GAMMA GLOBULIN: 1.1 G/DL — SIGNIFICANT CHANGE UP (ref 0.6–1.6)
GLUCOSE SERPL-MCNC: 103 MG/DL — HIGH (ref 70–99)
HCT VFR BLD CALC: 46.5 % — SIGNIFICANT CHANGE UP (ref 39–50)
HGB BLD-MCNC: 15.3 G/DL — SIGNIFICANT CHANGE UP (ref 13–17)
MAGNESIUM SERPL-MCNC: 2.1 MG/DL — SIGNIFICANT CHANGE UP (ref 1.6–2.6)
MCHC RBC-ENTMCNC: 30.1 PG — SIGNIFICANT CHANGE UP (ref 27–34)
MCHC RBC-ENTMCNC: 32.9 GM/DL — SIGNIFICANT CHANGE UP (ref 32–36)
MCV RBC AUTO: 91.5 FL — SIGNIFICANT CHANGE UP (ref 80–100)
NRBC # BLD: 0 /100 WBCS — SIGNIFICANT CHANGE UP (ref 0–0)
PHOSPHATE SERPL-MCNC: 2.8 MG/DL — SIGNIFICANT CHANGE UP (ref 2.5–4.5)
PLATELET # BLD AUTO: 184 K/UL — SIGNIFICANT CHANGE UP (ref 150–400)
POTASSIUM SERPL-MCNC: 4.1 MMOL/L — SIGNIFICANT CHANGE UP (ref 3.5–5.3)
POTASSIUM SERPL-SCNC: 4.1 MMOL/L — SIGNIFICANT CHANGE UP (ref 3.5–5.3)
PROT PATTERN SERPL ELPH-IMP: SIGNIFICANT CHANGE UP
PROT SERPL-MCNC: 6.7 G/DL — SIGNIFICANT CHANGE UP (ref 6–8.3)
PROT SERPL-MCNC: 6.8 G/DL — SIGNIFICANT CHANGE UP (ref 6–8.3)
RBC # BLD: 5.08 M/UL — SIGNIFICANT CHANGE UP (ref 4.2–5.8)
RBC # FLD: 12.2 % — SIGNIFICANT CHANGE UP (ref 10.3–14.5)
SODIUM SERPL-SCNC: 142 MMOL/L — SIGNIFICANT CHANGE UP (ref 135–145)
WBC # BLD: 8.66 K/UL — SIGNIFICANT CHANGE UP (ref 3.8–10.5)
WBC # FLD AUTO: 8.66 K/UL — SIGNIFICANT CHANGE UP (ref 3.8–10.5)

## 2020-05-19 RX ORDER — FAMOTIDINE 10 MG/ML
20 INJECTION INTRAVENOUS ONCE
Refills: 0 | Status: COMPLETED | OUTPATIENT
Start: 2020-05-19 | End: 2020-05-19

## 2020-05-19 RX ORDER — METOPROLOL TARTRATE 50 MG
12.5 TABLET ORAL
Refills: 0 | Status: DISCONTINUED | OUTPATIENT
Start: 2020-05-19 | End: 2020-05-20

## 2020-05-19 RX ADMIN — ENOXAPARIN SODIUM 40 MILLIGRAM(S): 100 INJECTION SUBCUTANEOUS at 12:01

## 2020-05-19 RX ADMIN — CLOPIDOGREL BISULFATE 75 MILLIGRAM(S): 75 TABLET, FILM COATED ORAL at 12:01

## 2020-05-19 RX ADMIN — ATORVASTATIN CALCIUM 40 MILLIGRAM(S): 80 TABLET, FILM COATED ORAL at 22:31

## 2020-05-19 RX ADMIN — PANTOPRAZOLE SODIUM 40 MILLIGRAM(S): 20 TABLET, DELAYED RELEASE ORAL at 06:32

## 2020-05-19 RX ADMIN — Medication 81 MILLIGRAM(S): at 12:01

## 2020-05-19 RX ADMIN — Medication 12.5 MILLIGRAM(S): at 17:13

## 2020-05-19 NOTE — PROGRESS NOTE ADULT - ASSESSMENT
64 years old Mandarin speaking man, with PMHX of  CVA (with left sided residual numbness for over 10 years, on aspirin and statin), CAD w/stent (on aspirin and plavix), HTN (metoprolol), c/o about 10-20 days of left sided numbness worse than baseline.  1.Tele monitoring.  2.Neurology eval noted.  3.Echocardiogram is normal can f/u as outpatient cardiologist.  4.CAD-asa.plavix,statin, b blocker to be restarted.  5.CVA-asa,plavix,statin.  6.HTN-b blocker.  7.GI and DVT prophylaxis.

## 2020-05-19 NOTE — PROGRESS NOTE ADULT - ASSESSMENT
1. Atelectasis  - Bronchodilators PRN  - O2 Supp PRN  - Incentive Spirometry  - PFTs as OP    2. R.O CVA  - Hx of CVA  - CT head noted.  - MRI negative.   - Neuro eval for numbness/tingling.   - DVT and GI PPX    3. Chest Pain  - Tele monitoring  - R/O ACS protocol  - Echo pending results.   - Cardio F/U

## 2020-05-19 NOTE — PROGRESS NOTE ADULT - SUBJECTIVE AND OBJECTIVE BOX
Pt is awake, alert, lying in bed in NAD. Having Echo this AM.     INTERVAL HPI/OVERNIGHT EVENTS:      VITAL SIGNS:  T(F): 98.2 (20 @ 11:33)  HR: 58 (20 @ 11:33)  BP: 130/71 (20 @ 11:33)  RR: 18 (20 @ 11:33)  SpO2: 98% (20 @ 11:33)  Wt(kg): --  I&O's Detail    18 May 2020 07:01  -  19 May 2020 07:00  --------------------------------------------------------  IN:    Oral Fluid: 230 mL  Total IN: 230 mL    OUT:  Total OUT: 0 mL    Total NET: 230 mL              REVIEW OF SYSTEMS:    CONSTITUTIONAL:  No fevers, chills, sweats    HEENT:  Eyes:  No diplopia or blurred vision. ENT:  No earache, sore throat or runny nose.    CARDIOVASCULAR:  No pressure, squeezing, tightness, or heaviness about the chest; no palpitations.    RESPIRATORY:  Per HPI    GASTROINTESTINAL:  No abdominal pain, nausea, vomiting or diarrhea.    GENITOURINARY:  No dysuria, frequency or urgency.    NEUROLOGIC:  No paresthesias, fasciculations, seizures or weakness.    PSYCHIATRIC:  No disorder of thought or mood.      PHYSICAL EXAM:    Constitutional: Well developed and nourished  Eyes:Perrla  ENMT: normal  Neck:supple  Respiratory: good air entry  Cardiovascular: S1 S2 regular  Gastrointestinal: Soft, Non tender  Extremities: No edema  Vascular:normal  Neurological:Awake, alert,Ox3  Musculoskeletal:Normal      MEDICATIONS  (STANDING):  aspirin  chewable 81 milliGRAM(s) Oral daily  atorvastatin 40 milliGRAM(s) Oral at bedtime  clopidogrel Tablet 75 milliGRAM(s) Oral daily  enoxaparin Injectable 40 milliGRAM(s) SubCutaneous daily  metoprolol tartrate 12.5 milliGRAM(s) Oral two times a day  pantoprazole    Tablet 40 milliGRAM(s) Oral before breakfast    MEDICATIONS  (PRN):      Allergies    No Known Allergies    Intolerances        LABS:                        15.3   8.66  )-----------( 184      ( 19 May 2020 06:27 )             46.5     -    142  |  108  |  19<H>  ----------------------------<  103<H>  4.1   |  31  |  0.85    Ca    8.6      19 May 2020 06:27  Phos  2.8       Mg     2.1         TPro  6.8  /  Alb  3.6  /  TBili  0.6  /  DBili  0.2  /  AST  24  /  ALT  27  /  AlkPhos  65  18      Urinalysis Basic - ( 18 May 2020 10:01 )    Color: Yellow / Appearance: Clear / S.015 / pH: x  Gluc: x / Ketone: Negative  / Bili: Negative / Urobili: Negative   Blood: x / Protein: Negative / Nitrite: Negative   Leuk Esterase: Negative / RBC: x / WBC x   Sq Epi: x / Non Sq Epi: x / Bacteria: x        CARDIAC MARKERS ( 18 May 2020 01:39 )  <0.015 ng/mL / x     / 147 U/L / x     / 1.0 ng/mL  CARDIAC MARKERS ( 17 May 2020 16:54 )  <0.015 ng/mL / x     / x     / x     / x          CAPILLARY BLOOD GLUCOSE        pro-bnp 150  @ 16:54     d-dimer --   @ 16:54      RADIOLOGY & ADDITIONAL TESTS:  < from: MR Head w/wo IV Cont (20 @ 17:33) >  IMPRESSION: No acute infarction.    < end of copied text >    CXR:    Ct scan chest:    ekg;    echo:

## 2020-05-19 NOTE — DISCHARGE NOTE PROVIDER - NSDCCPCAREPLAN_GEN_ALL_CORE_FT
PRINCIPAL DISCHARGE DIAGNOSIS  Diagnosis: Numbness  Assessment and Plan of Treatment: Patient was noted to have chronic left sided numbness and weakness. Patient was suspected of having a prior history of stroke the required craniotomy in [place]. CT Head and MRI were both performed and were negative for acute infarction. EKG was unremarkable and troponins were negative on two occasions. PT was consulted and determined the patient was independent and did not require any additional ambulatory services. Patient had an ECHO performed that demonstrated XXXXX. Patient was recommended to continue taking aspirin, plavix, and statin for stroke prevention.   Please follow up with your Neurologist and Cardiologist for additional neurological and cardiac health care. Please continue to take your Metoprolol, Aspirin 81 mg, Plavix 75 mg, and Atorvastatin for prevention of stroke and other cardiac pathology.      SECONDARY DISCHARGE DIAGNOSES  Diagnosis: Pre-diabetes  Assessment and Plan of Treatment: Hemoglobin A1c was found to be 6.3 during admission. Patient was placed on a Regular Consistent Carbs diet and a low dose insulin sliding scale. Patient's blood sugar was in good control during inpatient admission.   Please continue to monitor your blood sugar and have a whole grain and vegetable rich diet to maintain normal blood glucose levels.    Diagnosis: Hypertension, unspecified type  Assessment and Plan of Treatment: Patient was noted to have a chronic history of hypertension, but was normotensive during inpatient medical stay off of her anti-hypertensives. Patient was instructed to continue taking Metoprolol for BP control. PRINCIPAL DISCHARGE DIAGNOSIS  Diagnosis: Numbness  Assessment and Plan of Treatment: Patient was noted to have chronic left sided numbness and weakness. Patient was suspected of having a prior history of stroke the required craniotomy in [place]. CT Head and MRI were both performed and were negative for acute infarction. EKG was unremarkable and troponins were negative on two occasions. PT was consulted and determined the patient was independent and did not require any additional ambulatory services. Patient had an ECHO performed that demonstrated showed a normal ejection fraction over 55% and no pathology in the cardiac wall or valves. Patient was recommended to continue taking aspirin, plavix, and statin for stroke prevention.   Please follow up with your Neurologist and Cardiologist for additional neurological and cardiac health care. Please continue to take your Metoprolol, Aspirin 81 mg, Plavix 75 mg, and Atorvastatin for prevention of stroke and other cardiac pathology.      SECONDARY DISCHARGE DIAGNOSES  Diagnosis: Pre-diabetes  Assessment and Plan of Treatment: Hemoglobin A1c was found to be 6.3 during admission. Patient was placed on a Regular Consistent Carbs diet and a low dose insulin sliding scale. Patient's blood sugar was in good control during inpatient admission.   Please continue to monitor your blood sugar and have a whole grain and vegetable rich diet to maintain normal blood glucose levels.    Diagnosis: Hypertension, unspecified type  Assessment and Plan of Treatment: Patient was noted to have a chronic history of hypertension, but was normotensive during inpatient medical stay off of her anti-hypertensives. Patient was instructed to continue taking Metoprolol for BP control.

## 2020-05-19 NOTE — PROGRESS NOTE ADULT - PROBLEM SELECTOR PLAN 1
Patient is being admitted for rule out CVA- hx of CVA with Lt weakness  hx of craniotomy in [place] as per daughter, don't know when   Patient is found to have decreased sensation in left side of body  CT head is negative for any acute changes and showed chronic infarct (patient has history of stroke).   c/w telemonitoring  Neurology evaluation - Dr. Teague called    MRI is negative for acute infarction   PT: Indpt and No needs  Stat CT head in case of change in mental status.  c/w Plavix and ASA

## 2020-05-19 NOTE — DISCHARGE NOTE PROVIDER - HOSPITAL COURSE
64 years old Mandarin speaking man, with PMHX of  CVA (with left sided residual numbness for over 10 years, on aspirin and statin), CAD w/stent (on aspirin and plavix), HTN (metoprolol), c/o about 10-20 days of left sided numbness worse than baseline. Patient also complains of questionable weakness in left leg which is noticed since yesterday. Patient also reports several days of "pressure" in head and chest tightness as well.     Patient denies any palpitations, shortness of breath, fever, chills, nausea, vomiting, abdominal pain, change in urinary or bowel habits.    Patient is being admitted for rule out CVA. Patient is found to have decreased sensation in left side of body, CT head is negative for any acute changes and showed chronic infart (patient has history of stroke). Will admit to telemetry. Neurology evaluation. Likely need MRI head.    Patient is also found to have chest pressure with EKG showing T wave inversions. Patient cardiologist outpatient sent baseline EKG to compare, no new EKG changes. Will trend cardiac troponin will continue aspirin and statin.    cardiology consult requested.        Stroke Rule Out        Patient was noted to have chronic left sided numbness and weakness. Patient was suspected of having a prior history of stroke the required craniotomy in [place]. CT Head and MRI were both performed and were negative for acute infarction. EKG was unremarkable and troponins were negative on two occasions. PT was consulted and determined the patient was independent and did not require any additional ambulatory services. Patient had an ECHO performed that demonstrated XXXXX. Patient was recommended to continue taking aspirin, plavix, and statin for stroke prevention.         Hypertension        Patient was noted to have a chronic history of hypertension, but was normotensive during inpatient medical stay off of her anti-hypertensives. Patient was instructed to continue taking Metoprolol for BP control.         Type 2 Diabetes        Patient has a known history of Diabetes Mellitus. Hemoglobin A1c was found to be 6.3 during admission. Patient was placed on a Regular Consistent Carbs diet and a low dose insulin sliding scale. Patient's blood sugar was in good control during inpatient admission. 64 years old Mandarin speaking man, with PMHX of  CVA (with left sided residual numbness for over 10 years, on aspirin and statin), CAD w/stent (on aspirin and plavix), HTN (metoprolol), c/o about 10-20 days of left sided numbness worse than baseline. Patient also complains of questionable weakness in left leg which is noticed since yesterday. Patient also reports several days of "pressure" in head and chest tightness as well.     Patient denies any palpitations, shortness of breath, fever, chills, nausea, vomiting, abdominal pain, change in urinary or bowel habits.    Patient is being admitted for rule out CVA. Patient is found to have decreased sensation in left side of body, CT head is negative for any acute changes and showed chronic infart (patient has history of stroke). Will admit to telemetry. Neurology evaluation. Likely need MRI head.    Patient is also found to have chest pressure with EKG showing T wave inversions. Patient cardiologist outpatient sent baseline EKG to compare, no new EKG changes. Will trend cardiac troponin will continue aspirin and statin.    cardiology consult requested.        Stroke Rule Out        Patient was noted to have chronic left sided numbness and weakness. Patient was suspected of having a prior history of stroke the required craniotomy in [place]. CT Head and MRI were both performed and were negative for acute infarction. EKG was unremarkable and troponins were negative on two occasions. PT was consulted and determined the patient was independent and did not require any additional ambulatory services. Patient had an ECHO performed that demonstrated 55% ejection fraction with no cardiac wall or valvular abnormality. Patient was recommended to continue taking aspirin, plavix, and statin for stroke prevention.         Hypertension        Patient was noted to have a chronic history of hypertension, but was normotensive during inpatient medical stay off of her anti-hypertensives. Patient was instructed to continue taking Metoprolol for BP control.         Type 2 Diabetes        Patient has a known history of Diabetes Mellitus. Hemoglobin A1c was found to be 6.3 during admission. Patient was placed on a Regular Consistent Carbs diet and a low dose insulin sliding scale. Patient's blood sugar was in good control during inpatient admission.

## 2020-05-19 NOTE — DISCHARGE NOTE PROVIDER - CARE PROVIDER_API CALL
FLAKO AMOS  NEUROLOGY - GENERAL  [address] [place] [address]  Phone: [phone]  Fax: [phone]  Follow Up Time: 2 weeks    Ruth Solo  INTERNAL MEDICINE  [address] [place] [address]  Phone: [phone]  Fax: [phone]  Follow Up Time: 2 weeks    Jae Obando  Internal Medicine  [address] [place] [address]  Phone: [phone]  Fax: [phone]  Follow Up Time: 2 weeks

## 2020-05-19 NOTE — PROGRESS NOTE ADULT - SUBJECTIVE AND OBJECTIVE BOX
PGY1 Note discussed with supervising resident and primary attending.    Patient is a 64y old  Male who presents with a chief complaint of numbness left sided; chest pressure (19 May 2020 11:42)    INTERVAL HPI/OVERNIGHT EVENTS:  - No acute events overnight  - Continues to endorse mild left sided numbness, chronic in nature  - Hemodynamically stable and afebrile  - No new focal deficits noted on exam   - Tolerating a normal diet     MEDICATIONS  (STANDING):  aspirin  chewable 81 milliGRAM(s) Oral daily  atorvastatin 40 milliGRAM(s) Oral at bedtime  clopidogrel Tablet 75 milliGRAM(s) Oral daily  enoxaparin Injectable 40 milliGRAM(s) SubCutaneous daily  metoprolol tartrate 12.5 milliGRAM(s) Oral two times a day  pantoprazole    Tablet 40 milliGRAM(s) Oral before breakfast    MEDICATIONS  (PRN):    Allergies    No Known Allergies    Intolerances    REVIEW OF SYSTEMS:  CONSTITUTIONAL: No fever, weight loss, or fatigue  RESPIRATORY: No cough, wheezing, chills or hemoptysis; No shortness of breath  CARDIOVASCULAR: No chest pain, palpitations, dizziness, or leg swelling  GASTROINTESTINAL: No abdominal or epigastric pain. No nausea, vomiting, or hematemesis; No diarrhea or constipation. No melena or hematochezia.  NEUROLOGICAL: No headaches, memory loss, loss of strength, numbness, or tremors; left sided numbness (chronic in nature)  SKIN: No itching, burning, rashes, or lesions     Vital Signs Last 24 Hrs  T(C): 36.8 (19 May 2020 11:33), Max: 37.1 (18 May 2020 15:58)  T(F): 98.2 (19 May 2020 11:33), Max: 98.7 (18 May 2020 15:58)  HR: 58 (19 May 2020 11:33) (56 - 70)  BP: 130/71 (19 May 2020 11:33) (115/74 - 143/91)  BP(mean): --  RR: 18 (19 May 2020 11:33) (16 - 18)  SpO2: 98% (19 May 2020 11:33) (95% - 100%)    PHYSICAL EXAM:  GENERAL: NAD, well-groomed, well-developed  HEAD:  Atraumatic, Normocephalic  EYES: EOMI, PERRLA, conjunctiva and sclera clear  NECK: Supple, No JVD, Normal thyroid  CHEST/LUNG: Clear to percussion bilaterally; No rales, rhonchi, wheezing, or rubs  HEART: Regular rate and rhythm; No murmurs, rubs, or gallops  ABDOMEN: Soft, Nontender, Nondistended; Bowel sounds present  NERVOUS SYSTEM:  Alert & Oriented X3, Good concentration; Motor Strength 5/5 B/L   EXTREMITIES:  2+ Peripheral Pulses, No clubbing, cyanosis, or edema  SKIN: Warm, Dry    LABS:                        15.3   8.66  )-----------( 184      ( 19 May 2020 06:27 )             46.5     05-    142  |  108  |  19<H>  ----------------------------<  103<H>  4.1   |  31  |  0.85    Ca    8.6      19 May 2020 06:27  Phos  2.8     -  Mg     2.1     -    TPro  6.8  /  Alb  3.6  /  TBili  0.6  /  DBili  0.2  /  AST  24  /  ALT  27  /  AlkPhos  65  05-18    Urinalysis Basic - ( 18 May 2020 10:01 )    Color: Yellow / Appearance: Clear / S.015 / pH: x  Gluc: x / Ketone: Negative  / Bili: Negative / Urobili: Negative   Blood: x / Protein: Negative / Nitrite: Negative   Leuk Esterase: Negative / RBC: x / WBC x   Sq Epi: x / Non Sq Epi: x / Bacteria: x    CAPILLARY BLOOD GLUCOSE    RADIOLOGY & ADDITIONAL TESTS:    Imaging Personally Reviewed:  [ ] YES  [ ] NO    Consultant(s) Notes Reviewed:  [ ] YES  [ ] NO

## 2020-05-19 NOTE — PROGRESS NOTE ADULT - PROBLEM SELECTOR PLAN 2
denies chest pain on exam  Patient is also found to have chest pressure with EKG showing T wave inversions. Patient cardiologist outpatient sent baseline EKG to compare, no new EKG changes.  trend  troponin - T1 T2 negative   continue aspirin and statin.  Dr. Solo: Consult

## 2020-05-19 NOTE — PROGRESS NOTE ADULT - SUBJECTIVE AND OBJECTIVE BOX
Patient is a 64y old  Male who presents with a chief complaint of numbness left sided; chest pressure (18 May 2020 17:33)    pt seen in tele [ x ], reg med floor [   ], bed [ x ], chair at bedside [   ], a+o x3 [ x ], lethargic [  ],  nad [ x ]        Allergies    No Known Allergies        Vitals    T(F): 97.6 (05-19-20 @ 07:25), Max: 98.7 (05-18-20 @ 15:58)  HR: 70 (05-19-20 @ 07:25) (56 - 70)  BP: 143/91 (05-19-20 @ 07:25) (115/74 - 143/91)  RR: 18 (05-19-20 @ 07:25) (16 - 18)  SpO2: 97% (05-19-20 @ 07:25) (95% - 100%)  Wt(kg): --  CAPILLARY BLOOD GLUCOSE      POCT Blood Glucose.: 98 mg/dL (17 May 2020 15:37)      Labs                          15.3   8.66  )-----------( 184      ( 19 May 2020 06:27 )             46.5       05-19    142  |  108  |  19<H>  ----------------------------<  103<H>  4.1   |  31  |  0.85    Ca    8.6      19 May 2020 06:27  Phos  2.8     05-19  Mg     2.1     05-19    TPro  6.8  /  Alb  3.6  /  TBili  0.6  /  DBili  0.2  /  AST  24  /  ALT  27  /  AlkPhos  65  05-18      CARDIAC MARKERS ( 18 May 2020 01:39 )  <0.015 ng/mL / x     / 147 U/L / x     / 1.0 ng/mL  CARDIAC MARKERS ( 17 May 2020 16:54 )  <0.015 ng/mL / x     / x     / x     / x                Radiology Results      Meds    MEDICATIONS  (STANDING):  aspirin  chewable 81 milliGRAM(s) Oral daily  atorvastatin 40 milliGRAM(s) Oral at bedtime  clopidogrel Tablet 75 milliGRAM(s) Oral daily  enoxaparin Injectable 40 milliGRAM(s) SubCutaneous daily  pantoprazole    Tablet 40 milliGRAM(s) Oral before breakfast      MEDICATIONS  (PRN):      Physical Exam      Neuro :  no focal motor deficits  Respiratory: CTA B/L  CV: RRR, S1S2, no murmurs,   Abdominal: Soft, NT, ND +BS,  Extremities: No edema, + peripheral pulses    ASSESSMENT    r/o cva  Chest pain with ekg changes   r/o acs  h/o Coronary artery disease involving native heart, angina presence unspecified, unspecified vessel or lesion type  Hypertension, unspecified type  Cerebrovascular accident (CVA), unspecified mechanism s/p craniotomy with left residual numbness      PLAN      ct head with Status post right frontotemporal craniotomy. Chronic infarct in the right frontoparietal region (distal right MCA vascular territory) noted above.  neuro cons   cont asa, plavix and statin  ce x2 neg noted above  cardio cons   Patient cardiologist outpatient sent baseline EKG to compare, no new EKG changes.  f/u echo  cont current meds Patient is a 64y old  Male who presents with a chief complaint of numbness left sided; chest pressure (18 May 2020 17:33)    pt seen in tele [ x ], reg med floor [   ], bed [ x ], chair at bedside [   ], a+o x3 [ x ], lethargic [  ],  nad [ x ]        Allergies    No Known Allergies        Vitals    T(F): 97.6 (05-19-20 @ 07:25), Max: 98.7 (05-18-20 @ 15:58)  HR: 70 (05-19-20 @ 07:25) (56 - 70)  BP: 143/91 (05-19-20 @ 07:25) (115/74 - 143/91)  RR: 18 (05-19-20 @ 07:25) (16 - 18)  SpO2: 97% (05-19-20 @ 07:25) (95% - 100%)  Wt(kg): --  CAPILLARY BLOOD GLUCOSE      POCT Blood Glucose.: 98 mg/dL (17 May 2020 15:37)      Labs                          15.3   8.66  )-----------( 184      ( 19 May 2020 06:27 )             46.5       05-19    142  |  108  |  19<H>  ----------------------------<  103<H>  4.1   |  31  |  0.85    Ca    8.6      19 May 2020 06:27  Phos  2.8     05-19  Mg     2.1     05-19    TPro  6.8  /  Alb  3.6  /  TBili  0.6  /  DBili  0.2  /  AST  24  /  ALT  27  /  AlkPhos  65  05-18      CARDIAC MARKERS ( 18 May 2020 01:39 )  <0.015 ng/mL / x     / 147 U/L / x     / 1.0 ng/mL  CARDIAC MARKERS ( 17 May 2020 16:54 )  <0.015 ng/mL / x     / x     / x     / x                  Radiology Results      < from: MR Head w/wo IV Cont (05.18.20 @ 17:33) >  MPRESSION: No acute infarction.    < end of copied text >      Meds    MEDICATIONS  (STANDING):  aspirin  chewable 81 milliGRAM(s) Oral daily  atorvastatin 40 milliGRAM(s) Oral at bedtime  clopidogrel Tablet 75 milliGRAM(s) Oral daily  enoxaparin Injectable 40 milliGRAM(s) SubCutaneous daily  pantoprazole    Tablet 40 milliGRAM(s) Oral before breakfast      MEDICATIONS  (PRN):      Physical Exam      Neuro :  no focal motor deficits  Respiratory: CTA B/L  CV: RRR, S1S2, no murmurs,   Abdominal: Soft, NT, ND +BS,  Extremities: No edema, + peripheral pulses    ASSESSMENT    acute cva r/o  Chest pain with ekg changes   r/o acs  h/o Coronary artery disease involving native heart, angina presence unspecified, unspecified vessel or lesion type  Hypertension, unspecified type  Cerebrovascular accident (CVA), unspecified mechanism s/p craniotomy with left residual numbness      PLAN      ct head with Status post right frontotemporal craniotomy. Chronic infarct in the right frontoparietal region (distal right MCA vascular territory) noted  mri neg for acute infarct  neuro f/u   cont asa, plavix and statin  ce x2 neg noted above  cardio f/u   Patient cardiologist outpatient sent baseline EKG to compare, no new EKG changes.  f/u echo   resume lopressor  cont current meds   d/c plan pending echo results

## 2020-05-19 NOTE — DISCHARGE NOTE PROVIDER - PROVIDER TOKENS
PROVIDER:[TOKEN:[[id number]],FOLLOWUP:[2 weeks]],PROVIDER:[TOKEN:[[id number]],FOLLOWUP:[2 weeks]],PROVIDER:[TOKEN:[[id number]],FOLLOWUP:[2 weeks]]

## 2020-05-19 NOTE — PROGRESS NOTE ADULT - PROBLEM SELECTOR PLAN 3
s/p stent in 1/2020   continue aspirin, plavix, statin.  will hold metoprolol for now.  Lipid Profile: Normal

## 2020-05-20 VITALS
HEART RATE: 55 BPM | TEMPERATURE: 98 F | OXYGEN SATURATION: 97 % | SYSTOLIC BLOOD PRESSURE: 146 MMHG | DIASTOLIC BLOOD PRESSURE: 82 MMHG | RESPIRATION RATE: 18 BRPM

## 2020-05-20 LAB
ANA TITR SER: NEGATIVE — SIGNIFICANT CHANGE UP
ANION GAP SERPL CALC-SCNC: 3 MMOL/L — LOW (ref 5–17)
BASOPHILS # BLD AUTO: 0.02 K/UL — SIGNIFICANT CHANGE UP (ref 0–0.2)
BASOPHILS NFR BLD AUTO: 0.3 % — SIGNIFICANT CHANGE UP (ref 0–2)
BUN SERPL-MCNC: 16 MG/DL — SIGNIFICANT CHANGE UP (ref 7–18)
CALCIUM SERPL-MCNC: 8.9 MG/DL — SIGNIFICANT CHANGE UP (ref 8.4–10.5)
CHLORIDE SERPL-SCNC: 107 MMOL/L — SIGNIFICANT CHANGE UP (ref 96–108)
CO2 SERPL-SCNC: 31 MMOL/L — SIGNIFICANT CHANGE UP (ref 22–31)
CREAT SERPL-MCNC: 0.88 MG/DL — SIGNIFICANT CHANGE UP (ref 0.5–1.3)
EOSINOPHIL # BLD AUTO: 0.06 K/UL — SIGNIFICANT CHANGE UP (ref 0–0.5)
EOSINOPHIL NFR BLD AUTO: 0.8 % — SIGNIFICANT CHANGE UP (ref 0–6)
GLUCOSE SERPL-MCNC: 106 MG/DL — HIGH (ref 70–99)
HCT VFR BLD CALC: 45.6 % — SIGNIFICANT CHANGE UP (ref 39–50)
HGB BLD-MCNC: 15 G/DL — SIGNIFICANT CHANGE UP (ref 13–17)
IMM GRANULOCYTES NFR BLD AUTO: 0.1 % — SIGNIFICANT CHANGE UP (ref 0–1.5)
LYMPHOCYTES # BLD AUTO: 1.17 K/UL — SIGNIFICANT CHANGE UP (ref 1–3.3)
LYMPHOCYTES # BLD AUTO: 16.1 % — SIGNIFICANT CHANGE UP (ref 13–44)
MAGNESIUM SERPL-MCNC: 2.1 MG/DL — SIGNIFICANT CHANGE UP (ref 1.6–2.6)
MCHC RBC-ENTMCNC: 30.1 PG — SIGNIFICANT CHANGE UP (ref 27–34)
MCHC RBC-ENTMCNC: 32.9 GM/DL — SIGNIFICANT CHANGE UP (ref 32–36)
MCV RBC AUTO: 91.6 FL — SIGNIFICANT CHANGE UP (ref 80–100)
MONOCYTES # BLD AUTO: 0.42 K/UL — SIGNIFICANT CHANGE UP (ref 0–0.9)
MONOCYTES NFR BLD AUTO: 5.8 % — SIGNIFICANT CHANGE UP (ref 2–14)
NEUTROPHILS # BLD AUTO: 5.57 K/UL — SIGNIFICANT CHANGE UP (ref 1.8–7.4)
NEUTROPHILS NFR BLD AUTO: 76.9 % — SIGNIFICANT CHANGE UP (ref 43–77)
NRBC # BLD: 0 /100 WBCS — SIGNIFICANT CHANGE UP (ref 0–0)
PHOSPHATE SERPL-MCNC: 2.8 MG/DL — SIGNIFICANT CHANGE UP (ref 2.5–4.5)
PLATELET # BLD AUTO: 180 K/UL — SIGNIFICANT CHANGE UP (ref 150–400)
POTASSIUM SERPL-MCNC: 4.5 MMOL/L — SIGNIFICANT CHANGE UP (ref 3.5–5.3)
POTASSIUM SERPL-SCNC: 4.5 MMOL/L — SIGNIFICANT CHANGE UP (ref 3.5–5.3)
RBC # BLD: 4.98 M/UL — SIGNIFICANT CHANGE UP (ref 4.2–5.8)
RBC # FLD: 12.2 % — SIGNIFICANT CHANGE UP (ref 10.3–14.5)
RHEUMATOID FACT SERPL-ACNC: <10 IU/ML — SIGNIFICANT CHANGE UP (ref 0–13)
SODIUM SERPL-SCNC: 141 MMOL/L — SIGNIFICANT CHANGE UP (ref 135–145)
WBC # BLD: 7.25 K/UL — SIGNIFICANT CHANGE UP (ref 3.8–10.5)
WBC # FLD AUTO: 7.25 K/UL — SIGNIFICANT CHANGE UP (ref 3.8–10.5)

## 2020-05-20 PROCEDURE — 84100 ASSAY OF PHOSPHORUS: CPT

## 2020-05-20 PROCEDURE — 86803 HEPATITIS C AB TEST: CPT

## 2020-05-20 PROCEDURE — 80053 COMPREHEN METABOLIC PANEL: CPT

## 2020-05-20 PROCEDURE — 84155 ASSAY OF PROTEIN SERUM: CPT

## 2020-05-20 PROCEDURE — 83880 ASSAY OF NATRIURETIC PEPTIDE: CPT

## 2020-05-20 PROCEDURE — 93306 TTE W/DOPPLER COMPLETE: CPT

## 2020-05-20 PROCEDURE — 84443 ASSAY THYROID STIM HORMONE: CPT

## 2020-05-20 PROCEDURE — 84165 PROTEIN E-PHORESIS SERUM: CPT

## 2020-05-20 PROCEDURE — 70553 MRI BRAIN STEM W/O & W/DYE: CPT

## 2020-05-20 PROCEDURE — 82962 GLUCOSE BLOOD TEST: CPT

## 2020-05-20 PROCEDURE — 99285 EMERGENCY DEPT VISIT HI MDM: CPT | Mod: 25

## 2020-05-20 PROCEDURE — 80076 HEPATIC FUNCTION PANEL: CPT

## 2020-05-20 PROCEDURE — 86431 RHEUMATOID FACTOR QUANT: CPT

## 2020-05-20 PROCEDURE — 86038 ANTINUCLEAR ANTIBODIES: CPT

## 2020-05-20 PROCEDURE — 83735 ASSAY OF MAGNESIUM: CPT

## 2020-05-20 PROCEDURE — U0003: CPT

## 2020-05-20 PROCEDURE — 36415 COLL VENOUS BLD VENIPUNCTURE: CPT

## 2020-05-20 PROCEDURE — 80048 BASIC METABOLIC PNL TOTAL CA: CPT

## 2020-05-20 PROCEDURE — 80061 LIPID PANEL: CPT

## 2020-05-20 PROCEDURE — 81003 URINALYSIS AUTO W/O SCOPE: CPT

## 2020-05-20 PROCEDURE — 83036 HEMOGLOBIN GLYCOSYLATED A1C: CPT

## 2020-05-20 PROCEDURE — 84484 ASSAY OF TROPONIN QUANT: CPT

## 2020-05-20 PROCEDURE — 82553 CREATINE MB FRACTION: CPT

## 2020-05-20 PROCEDURE — 85652 RBC SED RATE AUTOMATED: CPT

## 2020-05-20 PROCEDURE — 85027 COMPLETE CBC AUTOMATED: CPT

## 2020-05-20 PROCEDURE — 82550 ASSAY OF CK (CPK): CPT

## 2020-05-20 PROCEDURE — 86140 C-REACTIVE PROTEIN: CPT

## 2020-05-20 PROCEDURE — 93005 ELECTROCARDIOGRAM TRACING: CPT

## 2020-05-20 PROCEDURE — 71045 X-RAY EXAM CHEST 1 VIEW: CPT

## 2020-05-20 PROCEDURE — 70450 CT HEAD/BRAIN W/O DYE: CPT

## 2020-05-20 RX ADMIN — Medication 12.5 MILLIGRAM(S): at 06:18

## 2020-05-20 RX ADMIN — PANTOPRAZOLE SODIUM 40 MILLIGRAM(S): 20 TABLET, DELAYED RELEASE ORAL at 06:18

## 2020-05-20 RX ADMIN — FAMOTIDINE 20 MILLIGRAM(S): 10 INJECTION INTRAVENOUS at 00:48

## 2020-05-20 NOTE — PROGRESS NOTE ADULT - SUBJECTIVE AND OBJECTIVE BOX
CHIEF COMPLAINT:Patient is a 64y old  Male who presents with a chief complaint of numbness left sided; chest pressure .Pt appears comfortable.    	  REVIEW OF SYSTEMS:  CONSTITUTIONAL: No fever, weight loss, or fatigue  EYES: No eye pain, visual disturbances, or discharge  ENT:  No difficulty hearing, tinnitus, vertigo; No sinus or throat pain  NECK: No pain or stiffness  RESPIRATORY: No cough, wheezing, chills or hemoptysis; No Shortness of Breath  CARDIOVASCULAR: No chest pain, palpitations, passing out, dizziness, or leg swelling  GASTROINTESTINAL: No abdominal or epigastric pain. No nausea, vomiting, or hematemesis; No diarrhea or constipation. No melena or hematochezia.  GENITOURINARY: No dysuria, frequency, hematuria, or incontinence  NEUROLOGICAL: No headaches, memory loss, loss of strength, numbness, or tremors  SKIN: No itching, burning, rashes, or lesions   LYMPH Nodes: No enlarged glands  ENDOCRINE: No heat or cold intolerance; No hair loss  MUSCULOSKELETAL: No joint pain or swelling; No muscle, back, or extremity pain  PSYCHIATRIC: No depression, anxiety, mood swings, or difficulty sleeping  HEME/LYMPH: No easy bruising, or bleeding gums  ALLERGY AND IMMUNOLOGIC: No hives or eczema	        PHYSICAL EXAM:  T(C): 36.8 (05-20-20 @ 07:45), Max: 36.9 (05-20-20 @ 05:01)  HR: 55 (05-20-20 @ 07:45) (55 - 62)  BP: 146/82 (05-20-20 @ 07:45) (126/71 - 146/82)  RR: 18 (05-20-20 @ 07:45) (18 - 18)  SpO2: 97% (05-20-20 @ 07:45) (96% - 98%)  Wt(kg): --  I&O's Summary    19 May 2020 07:01  -  20 May 2020 07:00  --------------------------------------------------------  IN: 275 mL / OUT: 0 mL / NET: 275 mL        Appearance: Normal	  HEENT:   Normal oral mucosa, PERRL, EOMI	  Lymphatic: No lymphadenopathy  Cardiovascular: Normal S1 S2, No JVD, No murmurs, No edema  Respiratory: Lungs clear to auscultation	  Psychiatry: A & O x 3, Mood & affect appropriate  Gastrointestinal:  Soft, Non-tender, + BS	  Skin: No rashes, No ecchymoses, No cyanosis	  Extremities: Normal range of motion, No clubbing, cyanosis or edema  Vascular: Peripheral pulses palpable 2+ bilaterally    MEDICATIONS  (STANDING):  aspirin  chewable 81 milliGRAM(s) Oral daily  atorvastatin 40 milliGRAM(s) Oral at bedtime  clopidogrel Tablet 75 milliGRAM(s) Oral daily  enoxaparin Injectable 40 milliGRAM(s) SubCutaneous daily  metoprolol tartrate 12.5 milliGRAM(s) Oral two times a day  pantoprazole    Tablet 40 milliGRAM(s) Oral before breakfast      	  	  LABS:	 	                       15.0   7.25  )-----------( 180      ( 20 May 2020 06:21 )             45.6     05-20    141  |  107  |  16  ----------------------------<  106<H>  4.5   |  31  |  0.88    Ca    8.9      20 May 2020 06:21  Phos  2.8     05-20  Mg     2.1     05-20    TPro  6.7  /  Alb  4.1  /  TBili  x   /  DBili  x   /  AST  x   /  ALT  x   /  AlkPhos  x   05-19    proBNP: Serum Pro-Brain Natriuretic Peptide: 150 pg/mL (05-17 @ 16:54)    Lipid Profile: Cholesterol 89  LDL 41  HDL 32  TG 81      TSH: Thyroid Stimulating Hormone, Serum: 1.77 uU/mL (05-18 @ 05:51)

## 2020-05-20 NOTE — PROGRESS NOTE ADULT - SUBJECTIVE AND OBJECTIVE BOX
Patient is a 64y old  Male who presents with a chief complaint of numbness left sided; chest pressure (20 May 2020 10:35)  Awake, alert, comfortable out of  bed in NAD    INTERVAL HPI/OVERNIGHT EVENTS:      VITAL SIGNS:  T(F): 98.3 (05-20-20 @ 07:45)  HR: 55 (05-20-20 @ 07:45)  BP: 146/82 (05-20-20 @ 07:45)  RR: 18 (05-20-20 @ 07:45)  SpO2: 97% (05-20-20 @ 07:45)  Wt(kg): --  I&O's Detail    19 May 2020 07:01  -  20 May 2020 07:00  --------------------------------------------------------  IN:    Oral Fluid: 275 mL  Total IN: 275 mL    OUT:  Total OUT: 0 mL    Total NET: 275 mL              REVIEW OF SYSTEMS:    CONSTITUTIONAL:  No fevers, chills, sweats    HEENT:  Eyes:  No diplopia or blurred vision. ENT:  No earache, sore throat or runny nose.    CARDIOVASCULAR:  No pressure, squeezing, tightness, or heaviness about the chest; no palpitations.    RESPIRATORY:  Per HPI    GASTROINTESTINAL:  No abdominal pain, nausea, vomiting or diarrhea.    GENITOURINARY:  No dysuria, frequency or urgency.    NEUROLOGIC:  No paresthesias, fasciculations, seizures or weakness.    PSYCHIATRIC:  No disorder of thought or mood.      PHYSICAL EXAM:    Constitutional: Well developed and nourished  Eyes:Perrla  ENMT: normal  Neck:supple  Respiratory: good air entry  Cardiovascular: S1 S2 regular  Gastrointestinal: Soft, Non tender  Extremities: No edema  Vascular:normal  Neurological:Awake, alert,Ox3  Musculoskeletal:Normal      MEDICATIONS  (STANDING):  aspirin  chewable 81 milliGRAM(s) Oral daily  atorvastatin 40 milliGRAM(s) Oral at bedtime  clopidogrel Tablet 75 milliGRAM(s) Oral daily  enoxaparin Injectable 40 milliGRAM(s) SubCutaneous daily  metoprolol tartrate 12.5 milliGRAM(s) Oral two times a day  pantoprazole    Tablet 40 milliGRAM(s) Oral before breakfast    MEDICATIONS  (PRN):      Allergies    No Known Allergies    Intolerances        LABS:                        15.0   7.25  )-----------( 180      ( 20 May 2020 06:21 )             45.6     05-20    141  |  107  |  16  ----------------------------<  106<H>  4.5   |  31  |  0.88    Ca    8.9      20 May 2020 06:21  Phos  2.8     05-20  Mg     2.1     05-20    TPro  6.7  /  Alb  4.1  /  TBili  x   /  DBili  x   /  AST  x   /  ALT  x   /  AlkPhos  x   05-19              CAPILLARY BLOOD GLUCOSE        pro-bnp 150 05-17 @ 16:54     d-dimer --  05-17 @ 16:54      RADIOLOGY & ADDITIONAL TESTS:    CXR:    Ct scan chest:    ekg;    echo:< from: Transthoracic Echocardiogram (05.19.20 @ 08:23) >  CONCLUSIONS:  1. Normal left ventricular internal dimensions and wall  thicknesses.  2. Normal left ventricular systolic function.  3. Normal right ventricular size and function.    < end of copied text >

## 2020-05-20 NOTE — DISCHARGE NOTE NURSING/CASE MANAGEMENT/SOCIAL WORK - PATIENT PORTAL LINK FT
You can access the FollowMyHealth Patient Portal offered by [facility] by registering at the following website: [email or web address]. By joining [employer]’s FollowMyHealth portal, you will also be able to view your health information using other applications (apps) compatible with our system.

## 2020-05-20 NOTE — PROGRESS NOTE ADULT - REASON FOR ADMISSION
numbness left sided; chest pressure

## 2020-05-20 NOTE — PROGRESS NOTE ADULT - ASSESSMENT
64 years old Mandarin speaking man, with PMHX of  CVA (with left sided residual numbness for over 10 years, on aspirin and statin), CAD w/stent (on aspirin and plavix), HTN (metoprolol), c/o about 10-20 days of left sided numbness worse than baseline.  1.D/C tele.  2.Neurology eval noted.  3.Echocardiogram is normal can f/u as outpatient cardiologist.  4.CAD-asa.plavix,statin, b blocker.  5.CVA-asa,plavix,statin.  6.HTN-b blocker.  7.GI and DVT prophylaxis.

## 2020-05-20 NOTE — PROGRESS NOTE ADULT - ASSESSMENT
1. Atelectasis  - Bronchodilators PRN  - O2 Supp PRN  - Incentive Spirometry  - PFTs as OP    2. R.O CVA  - Hx of CVA  - CT head noted.  - MRI negative.   - Neuro follow up for numbness/tingling.   - DVT and GI PPX    3. Chest Pain  - Tele DC  - ACS ruled out  - Echo results noted  - Cardio F/U

## 2020-05-20 NOTE — PROGRESS NOTE ADULT - SUBJECTIVE AND OBJECTIVE BOX
Patient is a 64y old  Male who presents with a chief complaint of numbness left sided; chest pressure (20 May 2020 10:35)    pt seen in icu [  ], reg med floor [  x ], bed [  ], chair at bedside [   ], a+o x3 [x  ], lethargic [  ],  nad [ x ]    hayden [  ], ngt [  ], peg [  ], et tube [  ], cent line [  ], picc line [  ]    Seen/Examined at 11:30am    Allergies    No Known Allergies        Vitals    T(F): 98.3 (05-20-20 @ 07:45), Max: 98.4 (05-20-20 @ 05:01)  HR: 55 (05-20-20 @ 07:45) (55 - 62)  BP: 146/82 (05-20-20 @ 07:45) (126/71 - 146/82)  RR: 18 (05-20-20 @ 07:45) (18 - 18)  SpO2: 97% (05-20-20 @ 07:45) (96% - 98%)  Wt(kg): --  CAPILLARY BLOOD GLUCOSE          Labs                          15.0   7.25  )-----------( 180      ( 20 May 2020 06:21 )             45.6       05-20    141  |  107  |  16  ----------------------------<  106<H>  4.5   |  31  |  0.88    Ca    8.9      20 May 2020 06:21  Phos  2.8     05-20  Mg     2.1     05-20    TPro  6.7  /  Alb  4.1  /  TBili  x   /  DBili  x   /  AST  x   /  ALT  x   /  AlkPhos  x   05-19                Radiology Results  < from: Transthoracic Echocardiogram (05.19.20 @ 08:23) >  ------------------------------------------------------------------------  CONCLUSIONS:  1. Normal left ventricular internal dimensions and wall  thicknesses.  2. Normal left ventricular systolic function.  3. Normal right ventricular size and function.    < end of copied text >      Meds    MEDICATIONS  (STANDING):  aspirin  chewable 81 milliGRAM(s) Oral daily  atorvastatin 40 milliGRAM(s) Oral at bedtime  clopidogrel Tablet 75 milliGRAM(s) Oral daily  enoxaparin Injectable 40 milliGRAM(s) SubCutaneous daily  metoprolol tartrate 12.5 milliGRAM(s) Oral two times a day  pantoprazole    Tablet 40 milliGRAM(s) Oral before breakfast      MEDICATIONS  (PRN):      Physical Exam    Neuro :  no focal deficits  Respiratory: CTA B/L  CV: RRR, S1S2, no murmurs,   Abdominal: Soft, NT, ND +BS,  Extremities: No edema, + peripheral pulses    ASSESSMENT    acute cva r/o  Chest pain with ekg changes   r/o acs  h/o Coronary artery disease involving native heart, angina presence unspecified, unspecified vessel or lesion type  Hypertension, unspecified type  Cerebrovascular accident (CVA), unspecified mechanism s/p craniotomy with left residual numbness      PLAN      ct head with Status post right frontotemporal craniotomy. Chronic infarct in the right frontoparietal region (distal right MCA vascular territory) noted  mri neg for acute infarct  neuro f/u   cont asa, plavix and statin  ce x2 neg noted above  cardio f/u   Patient cardiologist outpatient sent baseline EKG to compare, no new EKG changes.  resume lopressor  cont current meds   Echo noted.   d/c today.
